# Patient Record
Sex: FEMALE | Race: WHITE | NOT HISPANIC OR LATINO | URBAN - METROPOLITAN AREA
[De-identification: names, ages, dates, MRNs, and addresses within clinical notes are randomized per-mention and may not be internally consistent; named-entity substitution may affect disease eponyms.]

---

## 2020-09-11 ENCOUNTER — OFFICE (OUTPATIENT)
Dept: URBAN - METROPOLITAN AREA CLINIC 64 | Facility: CLINIC | Age: 60
End: 2020-09-11

## 2020-09-11 VITALS
SYSTOLIC BLOOD PRESSURE: 146 MMHG | WEIGHT: 206 LBS | HEART RATE: 75 BPM | HEIGHT: 68 IN | DIASTOLIC BLOOD PRESSURE: 91 MMHG

## 2020-09-11 DIAGNOSIS — R93.3 ABNORMAL FINDINGS ON DIAGNOSTIC IMAGING OF OTHER PARTS OF DI: ICD-10-CM

## 2020-09-11 PROCEDURE — 99204 OFFICE O/P NEW MOD 45 MIN: CPT | Performed by: INTERNAL MEDICINE

## 2024-10-31 ENCOUNTER — APPOINTMENT (OUTPATIENT)
Dept: WOMENS IMAGING | Facility: HOSPITAL | Age: 64
End: 2024-10-31
Payer: COMMERCIAL

## 2024-10-31 PROCEDURE — 77061 BREAST TOMOSYNTHESIS UNI: CPT | Performed by: RADIOLOGY

## 2024-10-31 PROCEDURE — G0279 TOMOSYNTHESIS, MAMMO: HCPCS | Performed by: RADIOLOGY

## 2024-10-31 PROCEDURE — 77065 DX MAMMO INCL CAD UNI: CPT | Performed by: RADIOLOGY

## 2024-11-20 ENCOUNTER — APPOINTMENT (OUTPATIENT)
Dept: OTHER | Facility: HOSPITAL | Age: 64
End: 2024-11-20
Payer: COMMERCIAL

## 2024-11-20 ENCOUNTER — LAB REQUISITION (OUTPATIENT)
Dept: LAB | Facility: HOSPITAL | Age: 64
End: 2024-11-20
Payer: COMMERCIAL

## 2024-11-20 ENCOUNTER — APPOINTMENT (OUTPATIENT)
Dept: WOMENS IMAGING | Facility: HOSPITAL | Age: 64
End: 2024-11-20
Payer: COMMERCIAL

## 2024-11-20 DIAGNOSIS — R92.1 MAMMOGRAPHIC CALCIFICATION FOUND ON DIAGNOSTIC IMAGING OF BREAST: ICD-10-CM

## 2024-11-20 PROCEDURE — 19081 BX BREAST 1ST LESION STRTCTC: CPT | Performed by: RADIOLOGY

## 2024-11-20 PROCEDURE — 88360 TUMOR IMMUNOHISTOCHEM/MANUAL: CPT | Performed by: STUDENT IN AN ORGANIZED HEALTH CARE EDUCATION/TRAINING PROGRAM

## 2024-11-20 PROCEDURE — 88305 TISSUE EXAM BY PATHOLOGIST: CPT | Performed by: STUDENT IN AN ORGANIZED HEALTH CARE EDUCATION/TRAINING PROGRAM

## 2024-11-20 PROCEDURE — A4648 IMPLANTABLE TISSUE MARKER: HCPCS | Performed by: RADIOLOGY

## 2024-11-20 PROCEDURE — C1819 TISSUE LOCALIZATION-EXCISION: HCPCS | Performed by: RADIOLOGY

## 2024-11-20 PROCEDURE — 88341 IMHCHEM/IMCYTCHM EA ADD ANTB: CPT | Performed by: STUDENT IN AN ORGANIZED HEALTH CARE EDUCATION/TRAINING PROGRAM

## 2024-11-20 PROCEDURE — 88342 IMHCHEM/IMCYTCHM 1ST ANTB: CPT | Performed by: STUDENT IN AN ORGANIZED HEALTH CARE EDUCATION/TRAINING PROGRAM

## 2024-11-21 ENCOUNTER — DOCUMENTATION (OUTPATIENT)
Dept: SURGERY | Facility: CLINIC | Age: 64
End: 2024-11-21
Payer: COMMERCIAL

## 2024-11-21 NOTE — PROGRESS NOTES
Patient scheduled with Dr. Eugene, worksheet given to [place] to send new patient paperwork. Annie at [facility] calling patient to inform her of appointment

## 2024-11-22 LAB
CYTO UR: NORMAL
DX PRELIMINARY: NORMAL
LAB AP CASE REPORT: NORMAL
LAB AP INTRADEPARTMENTAL CONSULT: NORMAL
LAB AP SPECIAL STAINS: NORMAL
PATH REPORT.FINAL DX SPEC: NORMAL
PATH REPORT.GROSS SPEC: NORMAL

## 2024-11-25 PROBLEM — N60.91 ATYPICAL DUCTAL HYPERPLASIA OF RIGHT BREAST: Status: ACTIVE | Noted: 2024-11-25

## 2024-12-02 NOTE — PROGRESS NOTES
[facility]     Referring Provider: Lisa Serrano MD     Chief complaint: newly diagnosed atypical hyperplasia     Subjective   HPI: Ms. Liliam Collins is a 64 y.o. yo woman, seen at the request of Lisa Serrano MD, for a new diagnosis of right breast ADH.      This was initially detected as an imaging abnormality on routine screening. She denies any changes to her breast, no masses, skin changes or nipple discharge. She has previoulsy undergone 1 previous breast biopsy on the left side.     She reports a pertinent PMH significant for HTN, Restless Leg Syndrome.     She denies any family history of breast or ovarian cancer.     She was joined today in clinic by her wife. She gave consent for her wife to be present during her examination and participate in the discussion.         Review of Systems   Constitutional: Negative.  Negative for appetite change, fatigue and fever.   HENT:   Positive for hearing loss and tinnitus.    Eyes: Negative.    Respiratory: Negative.  Negative for cough and shortness of breath.    Cardiovascular: Negative.    Gastrointestinal: Negative.  Negative for abdominal distention, diarrhea and nausea.   Endocrine: Negative.    Genitourinary: Negative.     Musculoskeletal: Negative.  Negative for arthralgias and back pain.   Skin: Negative.    Neurological: Negative.  Negative for dizziness, headaches and speech difficulty.   Hematological: Negative.    Psychiatric/Behavioral: Negative.  Negative for decreased concentration and sleep disturbance.        Medications:    Current Outpatient Medications:     amLODIPine (NORVASC) 10 MG tablet, Take 1 tablet by mouth Daily., Disp: , Rfl:     losartan (COZAAR) 100 MG tablet, Take 1 tablet by mouth Daily., Disp: , Rfl:     rOPINIRole (REQUIP) 1 MG tablet, Take 1 tablet by mouth Daily., Disp: , Rfl:     Allergies:  Allergies   Allergen Reactions    Lisinopril Unknown - Low Severity       Medical history:  History reviewed. No  "pertinent past medical history.    Surgical History:  History reviewed. No pertinent surgical history.    Family History:  Family History   Problem Relation Age of Onset    Melanoma Mother        Cancer Family History: Age of diagnosis/Age at death OR Age as of today  Breast Cancer: Denies  Ovarian Cancer: Denies  Pancreatic Cancer: Denies  Prostate Cancer: Denies  Colon Cancer: Denies  Lung Cancer: Denies     Social History:   Social History     Socioeconomic History    Marital status:    Tobacco Use    Smoking status: Never    Smokeless tobacco: Never   Vaping Use    Vaping status: Never Used   Substance and Sexual Activity    Alcohol use: Never    Drug use: Never       She lives with her wife.  She is employed as a .       GYNECOLOGIC HISTORY:   . P: 0. AB: 0.  Last menstrual period:   Age at menarche: 15  Age at first childbirth: N/A  Lactation: N/A  Age at menopause: 52  Total years of oral contraceptive use: 0  Total years of hormone replacement therapy: 0      Objective   PHYSICAL EXAMINATION  This exam was chaperoned by: [place]  /82   Pulse 79   Resp 18   Ht 173 cm (68.11\")   Wt 98.4 kg (217 lb)   SpO2 96%   BMI 32.89 kg/m²   ECOG 0 - Asymptomatic  General: NAD, well appearing  Psych: a&o x 3, normal mood and affect  Eyes: EOMI, no scleral icterus  ENMT: neck supple without masses or thyromegaly, mucus membranes moist  Resp: normal effort  CV: RRR, no edema   GI: soft, NT, ND  MSK: normal gait, normal ROM in bilateral shoulders  Lymph nodes:  no cervical, supraclavicular or axillary lymphadenopathy  Breast: symmetric, Bra 44DD, grade 3 ptosis  Right:  No visible abnormalities on inspection while seated, with arms raised or hands on hips. No masses, skin changes, or nipple abnormalities.  Left:  No visible abnormalities on inspection while seated, with arms raised or hands on hips. No masses, skin changes, or nipple abnormalities.      Imaging - documented images below have been " personally reviewed:  10/24/24 Bilateral Screening Mammogram  Scattered areas of fibroglandular density.  Calcifications measuring 4 mm seen in the posterior one third upper outer region of the right breast.  Left breast, no suspicious masses, significant calcifications or other abnormalities are seen.  Impression: Calcifications in the right breast require additional evaluation  BI-RADS 0    10/31/2024 right diagnostic mammogram  Additional evaluation for the calcifications in the right breast seen 10/24/2024.  On present there are amorphous calcifications with grouped distribution in the posterior one third upper outer region of the right breast.  Impression: Calcifications in the right breast are suspicious stereotactic biopsy is recommended  BI-RADS 4A    11/20/2024 stereotactic biopsy  Patient's right breast upper outer quadrant position was isolated and the calcifications were localized.  Using a superior approach and a 9 gauge vacuum-assisted device 12 core needle biopsy specimens were obtained.  A Top-Hat shaped biopsy clip was placed within the biopsy bed.  2 view postprocedure mammogram shows clip within the biopsy bed.  Pathology shows atypical ductal hyperplasia. Pathology is concordant.     Pathology:  11/20/24  1.  Breast, right upper outer quadrant, stereotactic core biopsy for calcifications: (Top-Hat clip)               A.  Atypical ductal hyperplasia with associated microcalcifications.               B.  Sclerosing adenosis.    Assessment & Plan   Assessment:  64 y.o. F with a new diagnosis of right breast Atypical Ductal Hyperplasia.     Discussion:  We discussed the clinical significance of ADH in terms of the potential for identifying associated malignancy at time of excision as well as its being a risk marker for future breast cancer in either breast.     We discussed management options in terms of ADH being a potential precursor, high risk benign lesion including complete surgical removal to  evaluate for associated malignancy as well as surveillance. The partial mastectomy procedure was explained. Potential risks of surgery including bleeding, infection, hematoma and scar were reviewed and we also discussed the potential need to return to the operating room in the event of close margins or malignancy were identified in the surgical specimen in which case a definitive cancer operation would be recommended.     We further reviewed that atypical hyperplasia is considered a risk factor for future development of a breast cancer in either breast, with the associated risk being approximately 2-4-fold general population risk (10-year estimated risk of ~17-26%; Breast Cancer Res Treat (2012) 136:627-633). We discussed recommendations for high risk breast surveillance going forward, with the goal of early detection should she develop a breast cancer. This would include annual mammography as well as annual breast mri, alternating every 6 months and combined with breast exams. In terms of chemoprevention, briefly discussed one of the original prevention trials that noted a 44-55% (overall, 3.7% versus 1.8%) reduction in breast cancer development at 5-years of tamoxifen use (J Natl Cancer Inst (1998) 90:1371-88). Noted that the benefit for individuals with atypical ductal hyperplasia (ADH) or atypical lobular breast hyperplasia (ALH) is even higher, estimated to reduce the risk of breast cancer development over 10 years from 21.3% down to 7.5% (Breast Cancer Res Treat (2012) 136:627-633). This benefit is cancer development but does not affect overall survival. We also discussed that the actual number of individuals receiving benefit must be weighed against the risks of tamoxifen which include vascular related events (stroke 1.59 times risk, pulmonary embolism 3 times risk and deep vein thrombosis 1.6 times risk versus placebo) and endometrial cancer (2.53 times increased risk versus placebo)(J Natl Cancer Inst  (1998) 90:1371-88). The risk of PE in more modern series can range from 1-2% per year, but usually drops after the first 24-36 months of therapy. We also discussed that alternate approaches can include aromatase inhibitors (when postmenopausal) and possible low tamoxifen therapy (5 mg daily or 10 mg every other day) as less common approaches that are being studied.             Plan:    - Surgical plans right breast partial mastectomy, kayley guided - remove remaining calcifications and ensure no adjacent cancer.  - medical oncology for discussion of chemoprevention - referral will be discussed post op pending final surgical pathology  - annual mammography alternating with annual MRI, will obtain one preop  - valium for cluastrophobia  - follow-up breast surgical oncology for clinical exam 1-2 times per year      Namrata Eugene MD  Breast Surgical Oncology    I spent 60 minutes caring for Liliam on this date of service. This time includes time spent by me in the following activities: preparing for the visit, reviewing tests, obtaining and/or reviewing a separately obtained history, performing a medically appropriate examination and/or evaluation , counseling and educating the patient/family/caregiver, ordering medications, tests, or procedures, referring and communicating with other health care professionals , documenting information in the medical record, independently interpreting results and communicating that information with the patient/family/caregiver, and care coordination.      CC:  MD Liz Sherman, MD Nilsa Curtis Katherine MD

## 2024-12-04 ENCOUNTER — OFFICE VISIT (OUTPATIENT)
Dept: SURGERY | Facility: CLINIC | Age: 64
End: 2024-12-04
Payer: COMMERCIAL

## 2024-12-04 ENCOUNTER — PREP FOR SURGERY (OUTPATIENT)
Dept: OTHER | Facility: HOSPITAL | Age: 64
End: 2024-12-04

## 2024-12-04 VITALS
HEART RATE: 79 BPM | DIASTOLIC BLOOD PRESSURE: 82 MMHG | OXYGEN SATURATION: 96 % | RESPIRATION RATE: 18 BRPM | SYSTOLIC BLOOD PRESSURE: 152 MMHG | HEIGHT: 68 IN | BODY MASS INDEX: 32.89 KG/M2 | WEIGHT: 217 LBS

## 2024-12-04 DIAGNOSIS — N60.91 ATYPICAL DUCTAL HYPERPLASIA OF RIGHT BREAST: Primary | ICD-10-CM

## 2024-12-04 DIAGNOSIS — F40.240 CLAUSTROPHOBIA: ICD-10-CM

## 2024-12-04 PROBLEM — G25.81 RESTLESS LEG SYNDROME: Status: ACTIVE | Noted: 2020-07-15

## 2024-12-04 PROBLEM — Z80.8 FAMILY HISTORY OF MELANOMA: Status: ACTIVE | Noted: 2022-12-28

## 2024-12-04 RX ORDER — AMLODIPINE BESYLATE 10 MG/1
10 TABLET ORAL DAILY
COMMUNITY
Start: 2024-11-05

## 2024-12-04 RX ORDER — ROPINIROLE 1 MG/1
1 TABLET, FILM COATED ORAL DAILY
COMMUNITY
Start: 2024-07-05

## 2024-12-04 RX ORDER — DIAZEPAM 10 MG/1
10 TABLET ORAL ONCE
Qty: 1 TABLET | Refills: 0 | Status: SHIPPED | OUTPATIENT
Start: 2024-12-04 | End: 2024-12-04

## 2024-12-04 RX ORDER — LOSARTAN POTASSIUM 100 MG/1
100 TABLET ORAL DAILY
COMMUNITY
Start: 2024-06-25

## 2024-12-04 NOTE — LETTER
December 4, 2024     Pina Hill MD  [address]  [address]  [place] [address]    Patient: Liliam Collins   YOB: 1960   Date of Visit: 12/4/2024     Dear Pina Hill MD:       Thank you for referring Liliam Collins to me for evaluation. Below are the relevant portions of my assessment and plan of care.    If you have questions, please do not hesitate to call me. I look forward to following Liliam along with you.         Sincerely,        Namrata Eugene MD        CC: MD Yoko Sherman MD Couch, MD Namrata  12/04/24 1405  Sign when Signing Visit  [facility]     Referring Provider: Lisa Serrano MD     Chief complaint: newly diagnosed atypical hyperplasia     Subjective  HPI: Ms. Liliam Collins is a 64 y.o. yo woman, seen at the request of Lisa Serrano MD, for a new diagnosis of right breast ADH.      This was initially detected as an imaging abnormality on routine screening. She denies any changes to her breast, no masses, skin changes or nipple discharge. She has previoulsy undergone 1 previous breast biopsy on the left side.     She reports a pertinent PMH significant for HTN, Restless Leg Syndrome.     She denies any family history of breast or ovarian cancer.     She was joined today in clinic by her wife. She gave consent for her wife to be present during her examination and participate in the discussion.         Review of Systems   Constitutional: Negative.  Negative for appetite change, fatigue and fever.   HENT:   Positive for hearing loss and tinnitus.    Eyes: Negative.    Respiratory: Negative.  Negative for cough and shortness of breath.    Cardiovascular: Negative.    Gastrointestinal: Negative.  Negative for abdominal distention, diarrhea and nausea.   Endocrine: Negative.    Genitourinary: Negative.     Musculoskeletal: Negative.  Negative for arthralgias and back pain.   Skin: Negative.    Neurological: Negative.   "Negative for dizziness, headaches and speech difficulty.   Hematological: Negative.    Psychiatric/Behavioral: Negative.  Negative for decreased concentration and sleep disturbance.        Medications:    Current Outpatient Medications:   •  amLODIPine (NORVASC) 10 MG tablet, Take 1 tablet by mouth Daily., Disp: , Rfl:   •  losartan (COZAAR) 100 MG tablet, Take 1 tablet by mouth Daily., Disp: , Rfl:   •  rOPINIRole (REQUIP) 1 MG tablet, Take 1 tablet by mouth Daily., Disp: , Rfl:     Allergies:  Allergies   Allergen Reactions   • Lisinopril Unknown - Low Severity       Medical history:  History reviewed. No pertinent past medical history.    Surgical History:  History reviewed. No pertinent surgical history.    Family History:  Family History   Problem Relation Age of Onset   • Melanoma Mother        Cancer Family History: Age of diagnosis/Age at death OR Age as of today  Breast Cancer: Denies  Ovarian Cancer: Denies  Pancreatic Cancer: Denies  Prostate Cancer: Denies  Colon Cancer: Denies  Lung Cancer: Denies     Social History:   Social History     Socioeconomic History   • Marital status:    Tobacco Use   • Smoking status: Never   • Smokeless tobacco: Never   Vaping Use   • Vaping status: Never Used   Substance and Sexual Activity   • Alcohol use: Never   • Drug use: Never       She lives with her wife.  She is employed as a .       GYNECOLOGIC HISTORY:   . P: 0. AB: 0.  Last menstrual period:   Age at menarche: 15  Age at first childbirth: N/A  Lactation: N/A  Age at menopause: 52  Total years of oral contraceptive use: 0  Total years of hormone replacement therapy: 0      Objective  PHYSICAL EXAMINATION  This exam was chaperoned by: [place]  /82   Pulse 79   Resp 18   Ht 173 cm (68.11\")   Wt 98.4 kg (217 lb)   SpO2 96%   BMI 32.89 kg/m²   ECOG 0 - Asymptomatic  General: NAD, well appearing  Psych: a&o x 3, normal mood and affect  Eyes: EOMI, no scleral icterus  ENMT: neck supple " without masses or thyromegaly, mucus membranes moist  Resp: normal effort  CV: RRR, no edema   GI: soft, NT, ND  MSK: normal gait, normal ROM in bilateral shoulders  Lymph nodes:  no cervical, supraclavicular or axillary lymphadenopathy  Breast: symmetric, Bra 44DD, grade 3 ptosis  Right:  No visible abnormalities on inspection while seated, with arms raised or hands on hips. No masses, skin changes, or nipple abnormalities.  Left:  No visible abnormalities on inspection while seated, with arms raised or hands on hips. No masses, skin changes, or nipple abnormalities.      Imaging - documented images below have been personally reviewed:  10/24/24 Bilateral Screening Mammogram  Scattered areas of fibroglandular density.  Calcifications measuring 4 mm seen in the posterior one third upper outer region of the right breast.  Left breast, no suspicious masses, significant calcifications or other abnormalities are seen.  Impression: Calcifications in the right breast require additional evaluation  BI-RADS 0    10/31/2024 right diagnostic mammogram  Additional evaluation for the calcifications in the right breast seen 10/24/2024.  On present there are amorphous calcifications with grouped distribution in the posterior one third upper outer region of the right breast.  Impression: Calcifications in the right breast are suspicious stereotactic biopsy is recommended  BI-RADS 4A    11/20/2024 stereotactic biopsy  Patient's right breast upper outer quadrant position was isolated and the calcifications were localized.  Using a superior approach and a 9 gauge vacuum-assisted device 12 core needle biopsy specimens were obtained.  A Top-Hat shaped biopsy clip was placed within the biopsy bed.  2 view postprocedure mammogram shows clip within the biopsy bed.  Pathology shows atypical ductal hyperplasia. Pathology is concordant.     Pathology:  11/20/24  1.  Breast, right upper outer quadrant, stereotactic core biopsy for  calcifications: (Top-Hat clip)               A.  Atypical ductal hyperplasia with associated microcalcifications.               B.  Sclerosing adenosis.    Assessment & Plan  Assessment:  64 y.o. F with a new diagnosis of right breast Atypical Ductal Hyperplasia.     Discussion:  We discussed the clinical significance of ADH in terms of the potential for identifying associated malignancy at time of excision as well as its being a risk marker for future breast cancer in either breast.     We discussed management options in terms of ADH being a potential precursor, high risk benign lesion including complete surgical removal to evaluate for associated malignancy as well as surveillance. The partial mastectomy procedure was explained. Potential risks of surgery including bleeding, infection, hematoma and scar were reviewed and we also discussed the potential need to return to the operating room in the event of close margins or malignancy were identified in the surgical specimen in which case a definitive cancer operation would be recommended.     We further reviewed that atypical hyperplasia is considered a risk factor for future development of a breast cancer in either breast, with the associated risk being approximately 2-4-fold general population risk (10-year estimated risk of ~17-26%; Breast Cancer Res Treat (2012) 136:627-633). We discussed recommendations for high risk breast surveillance going forward, with the goal of early detection should she develop a breast cancer. This would include annual mammography as well as annual breast mri, alternating every 6 months and combined with breast exams. In terms of chemoprevention, briefly discussed one of the original prevention trials that noted a 44-55% (overall, 3.7% versus 1.8%) reduction in breast cancer development at 5-years of tamoxifen use (J Natl Cancer Inst (1998) 90:1371-88). Noted that the benefit for individuals with atypical ductal hyperplasia (ADH) or  atypical lobular breast hyperplasia (ALH) is even higher, estimated to reduce the risk of breast cancer development over 10 years from 21.3% down to 7.5% (Breast Cancer Res Treat (2012) 136:627-633). This benefit is cancer development but does not affect overall survival. We also discussed that the actual number of individuals receiving benefit must be weighed against the risks of tamoxifen which include vascular related events (stroke 1.59 times risk, pulmonary embolism 3 times risk and deep vein thrombosis 1.6 times risk versus placebo) and endometrial cancer (2.53 times increased risk versus placebo)(J Natl Cancer Inst (1998) 90:1371-88). The risk of PE in more modern series can range from 1-2% per year, but usually drops after the first 24-36 months of therapy. We also discussed that alternate approaches can include aromatase inhibitors (when postmenopausal) and possible low tamoxifen therapy (5 mg daily or 10 mg every other day) as less common approaches that are being studied.             Plan:    - Surgical plans right breast partial mastectomy, kayley guided - remove remaining calcifications and ensure no adjacent cancer.  - medical oncology for discussion of chemoprevention - referral will be discussed post op pending final surgical pathology  - annual mammography alternating with annual MRI, will obtain one preop  - follow-up breast surgical oncology for clinical exam 1-2 times per year      Namrata Eugene MD  Breast Surgical Oncology    I spent 60 minutes caring for Liliam on this date of service. This time includes time spent by me in the following activities: preparing for the visit, reviewing tests, obtaining and/or reviewing a separately obtained history, performing a medically appropriate examination and/or evaluation , counseling and educating the patient/family/caregiver, ordering medications, tests, or procedures, referring and communicating with other health care professionals , documenting information  in the medical record, independently interpreting results and communicating that information with the patient/family/caregiver, and care coordination.      CC:  MD Liz Sherman, MD Nilsa Curtis, Yoko PEDROZA

## 2024-12-05 ENCOUNTER — TELEPHONE (OUTPATIENT)
Dept: SURGERY | Facility: CLINIC | Age: 64
End: 2024-12-05
Payer: COMMERCIAL

## 2024-12-05 NOTE — TELEPHONE ENCOUNTER
Pt notified her bilateral breast MRI has been moved up to 12/11/2024 at [facility] arrival time of 5:00 p.m.    CMA

## 2024-12-06 ENCOUNTER — PATIENT ROUNDING (BHMG ONLY) (OUTPATIENT)
Dept: SURGERY | Facility: CLINIC | Age: 64
End: 2024-12-06
Payer: COMMERCIAL

## 2024-12-06 NOTE — PROGRESS NOTES
December 6, 2024    Hello, may I speak with Liliam Collins?    My name is Sofi      I am  with [facility]  [address] [address] [phone].    Before we get started may I verify your date of birth? 1960    I am calling to officially welcome you to our practice and ask about your recent visit. Is this a good time to talk? yes  Done in clinic at time of visit    Tell me about your visit with us. What things went well?  Everything- pt is very happy with [facility] as a whole       We're always looking for ways to make our patients' experiences even better. Do you have recommendations on ways we may improve?  no    Overall were you satisfied with your first visit to our practice? yes       I appreciate you taking the time to speak with me today. Is there anything else I can do for you? no      Thank you, and have a great day.

## 2024-12-09 ENCOUNTER — TELEPHONE (OUTPATIENT)
Dept: SURGERY | Facility: CLINIC | Age: 64
End: 2024-12-09
Payer: COMMERCIAL

## 2024-12-09 NOTE — TELEPHONE ENCOUNTER
Pt notified of the following appts:    Sharee placement is scheduled on 1/3/2025 at [facility] 2:00  PAT is scheduled on 1/3/2025 at 4:00  Surgery is scheduled on 1/10/2025 arrive at 11:30 for a 1:30 start time.   Post op appt is scheduled on 1/22/2025 at 1:30    Pt verbalized understanding of appt times, dates and locations. Surgery packet mailed.     CMA

## 2024-12-09 NOTE — TELEPHONE ENCOUNTER
Left message for pt to call back to confirm the following appts:    Sharee placement is scheduled at [facility] on 1/3/2025 at 2:00  PAT is scheduled same day 1/3/2025 at 4:00  Surgery is scheduled on 1/10/2025 arrive at 11:30 for a surgery start time of 1:30     Surgery packet mailed.     CMA

## 2024-12-11 ENCOUNTER — HOSPITAL ENCOUNTER (OUTPATIENT)
Facility: HOSPITAL | Age: 64
Discharge: HOME | End: 2024-12-11
Admitting: STUDENT IN AN ORGANIZED HEALTH CARE EDUCATION/TRAINING PROGRAM
Payer: COMMERCIAL

## 2024-12-11 DIAGNOSIS — N60.91 ATYPICAL DUCTAL HYPERPLASIA OF RIGHT BREAST: ICD-10-CM

## 2024-12-11 PROCEDURE — 25510000002 GADOBENATE DIMEGLUMINE 529 MG/ML SOLUTION: Performed by: STUDENT IN AN ORGANIZED HEALTH CARE EDUCATION/TRAINING PROGRAM

## 2024-12-11 PROCEDURE — A9577 INJ MULTIHANCE: HCPCS | Performed by: STUDENT IN AN ORGANIZED HEALTH CARE EDUCATION/TRAINING PROGRAM

## 2024-12-11 PROCEDURE — 77049 MRI BREAST C-+ W/CAD BI: CPT

## 2024-12-11 RX ADMIN — GADOBENATE DIMEGLUMINE 20 ML: 529 INJECTION, SOLUTION INTRAVENOUS at 18:20

## 2024-12-13 ENCOUNTER — TELEPHONE (OUTPATIENT)
Dept: SURGERY | Facility: CLINIC | Age: 64
End: 2024-12-13
Payer: COMMERCIAL

## 2024-12-13 NOTE — TELEPHONE ENCOUNTER
Called Liliam Collins to discuss imaging results.  All questions answered.  Needs breast US, my office will schedule. No longer out of town around the end of December - ok to schedule soon    Namrata Eugene MD

## 2024-12-16 ENCOUNTER — TELEPHONE (OUTPATIENT)
Dept: SURGERY | Facility: CLINIC | Age: 64
End: 2024-12-16
Payer: COMMERCIAL

## 2024-12-16 DIAGNOSIS — N60.91 ATYPICAL DUCTAL HYPERPLASIA OF RIGHT BREAST: Primary | ICD-10-CM

## 2024-12-16 DIAGNOSIS — R92.8 ABNORMAL MAMMOGRAM: ICD-10-CM

## 2024-12-16 NOTE — TELEPHONE ENCOUNTER
Lmtc to confirm the following appt:  Left breast U/S is scheduled at [facility] on 12/26/2024 at 12:30    CMA

## 2024-12-26 ENCOUNTER — HOSPITAL ENCOUNTER (OUTPATIENT)
Dept: ULTRASOUND IMAGING | Facility: HOSPITAL | Age: 64
Discharge: HOME | End: 2024-12-26
Admitting: STUDENT IN AN ORGANIZED HEALTH CARE EDUCATION/TRAINING PROGRAM
Payer: COMMERCIAL

## 2024-12-26 ENCOUNTER — TELEPHONE (OUTPATIENT)
Dept: SURGERY | Facility: CLINIC | Age: 64
End: 2024-12-26
Payer: COMMERCIAL

## 2024-12-26 DIAGNOSIS — R92.8 ABNORMAL MAMMOGRAM: ICD-10-CM

## 2024-12-26 PROCEDURE — 76642 ULTRASOUND BREAST LIMITED: CPT

## 2024-12-26 NOTE — TELEPHONE ENCOUNTER
Left breast u/s performed today.  Dr. Marin recommends a left breast u/s guided biopsy.  Pt is scheduled at [facility] on 1/3/2025 for a kayley placement.  Dr. Marin requests that this biopsy be performed at [facility] due to this pts upcoming surgery.     Pt has been notified her arrival time at [facility] has changed to 12:45 so her biopsy can be performed prior to her kayley placement.     Pt verbalized understanding.      CMA

## 2025-01-03 ENCOUNTER — APPOINTMENT (OUTPATIENT)
Dept: WOMENS IMAGING | Facility: HOSPITAL | Age: 65
End: 2025-01-03
Payer: COMMERCIAL

## 2025-01-03 ENCOUNTER — LAB REQUISITION (OUTPATIENT)
Dept: LAB | Facility: HOSPITAL | Age: 65
End: 2025-01-03
Payer: COMMERCIAL

## 2025-01-03 ENCOUNTER — PRE-ADMISSION TESTING (OUTPATIENT)
Dept: PREADMISSION TESTING | Facility: HOSPITAL | Age: 65
End: 2025-01-03
Payer: COMMERCIAL

## 2025-01-03 VITALS
TEMPERATURE: 96.2 F | HEART RATE: 81 BPM | DIASTOLIC BLOOD PRESSURE: 81 MMHG | OXYGEN SATURATION: 98 % | RESPIRATION RATE: 16 BRPM | SYSTOLIC BLOOD PRESSURE: 135 MMHG | BODY MASS INDEX: 32.02 KG/M2 | HEIGHT: 69 IN | WEIGHT: 216.2 LBS

## 2025-01-03 DIAGNOSIS — N60.91 ATYPICAL DUCTAL HYPERPLASIA OF RIGHT BREAST: ICD-10-CM

## 2025-01-03 DIAGNOSIS — N63.0 UNSPECIFIED LUMP IN UNSPECIFIED BREAST: ICD-10-CM

## 2025-01-03 LAB
ANION GAP SERPL CALCULATED.3IONS-SCNC: 14.3 MMOL/L (ref 5–15)
BASOPHILS # BLD AUTO: 0.05 10*3/MM3 (ref 0–0.2)
BASOPHILS NFR BLD AUTO: 0.6 % (ref 0–1.5)
BUN SERPL-MCNC: 11 MG/DL (ref 8–23)
BUN/CREAT SERPL: 12.6 (ref 7–25)
CALCIUM SPEC-SCNC: 9.3 MG/DL (ref 8.6–10.5)
CHLORIDE SERPL-SCNC: 102 MMOL/L (ref 98–107)
CO2 SERPL-SCNC: 23.7 MMOL/L (ref 22–29)
CREAT SERPL-MCNC: 0.87 MG/DL (ref 0.57–1)
DEPRECATED RDW RBC AUTO: 39.9 FL (ref 37–54)
EGFRCR SERPLBLD CKD-EPI 2021: 75.4 ML/MIN/1.73
EOSINOPHIL # BLD AUTO: 0.13 10*3/MM3 (ref 0–0.4)
EOSINOPHIL NFR BLD AUTO: 1.6 % (ref 0.3–6.2)
ERYTHROCYTE [DISTWIDTH] IN BLOOD BY AUTOMATED COUNT: 12.3 % (ref 12.3–15.4)
GLUCOSE SERPL-MCNC: 95 MG/DL (ref 65–99)
HCT VFR BLD AUTO: 42.7 % (ref 34–46.6)
HGB BLD-MCNC: 13.8 G/DL (ref 12–15.9)
IMM GRANULOCYTES # BLD AUTO: 0.03 10*3/MM3 (ref 0–0.05)
IMM GRANULOCYTES NFR BLD AUTO: 0.4 % (ref 0–0.5)
LYMPHOCYTES # BLD AUTO: 1.94 10*3/MM3 (ref 0.7–3.1)
LYMPHOCYTES NFR BLD AUTO: 23.3 % (ref 19.6–45.3)
MCH RBC QN AUTO: 29.1 PG (ref 26.6–33)
MCHC RBC AUTO-ENTMCNC: 32.3 G/DL (ref 31.5–35.7)
MCV RBC AUTO: 89.9 FL (ref 79–97)
MONOCYTES # BLD AUTO: 0.68 10*3/MM3 (ref 0.1–0.9)
MONOCYTES NFR BLD AUTO: 8.2 % (ref 5–12)
NEUTROPHILS NFR BLD AUTO: 5.49 10*3/MM3 (ref 1.7–7)
NEUTROPHILS NFR BLD AUTO: 65.9 % (ref 42.7–76)
NRBC BLD AUTO-RTO: 0 /100 WBC (ref 0–0.2)
PLATELET # BLD AUTO: 301 10*3/MM3 (ref 140–450)
PMV BLD AUTO: 10.6 FL (ref 6–12)
POTASSIUM SERPL-SCNC: 4.1 MMOL/L (ref 3.5–5.2)
QT INTERVAL: 396 MS
QTC INTERVAL: 437 MS
RBC # BLD AUTO: 4.75 10*6/MM3 (ref 3.77–5.28)
SODIUM SERPL-SCNC: 140 MMOL/L (ref 136–145)
WBC NRBC COR # BLD AUTO: 8.32 10*3/MM3 (ref 3.4–10.8)

## 2025-01-03 PROCEDURE — 19083 BX BREAST 1ST LESION US IMAG: CPT | Performed by: RADIOLOGY

## 2025-01-03 PROCEDURE — C1728 CATH, BRACHYTX SEED ADM: HCPCS | Performed by: RADIOLOGY

## 2025-01-03 PROCEDURE — 93005 ELECTROCARDIOGRAM TRACING: CPT

## 2025-01-03 PROCEDURE — 36415 COLL VENOUS BLD VENIPUNCTURE: CPT

## 2025-01-03 PROCEDURE — A4648 IMPLANTABLE TISSUE MARKER: HCPCS | Performed by: RADIOLOGY

## 2025-01-03 PROCEDURE — 85025 COMPLETE CBC W/AUTO DIFF WBC: CPT

## 2025-01-03 PROCEDURE — 88305 TISSUE EXAM BY PATHOLOGIST: CPT | Performed by: STUDENT IN AN ORGANIZED HEALTH CARE EDUCATION/TRAINING PROGRAM

## 2025-01-03 PROCEDURE — 19285 PERQ DEV BREAST 1ST US IMAG: CPT | Performed by: RADIOLOGY

## 2025-01-03 PROCEDURE — 80048 BASIC METABOLIC PNL TOTAL CA: CPT

## 2025-01-03 NOTE — DISCHARGE INSTRUCTIONS
Take the following medications the morning of surgery:    AMLODIPINE    If you are on prescription narcotic pain medication to control your pain you may also take that medication the morning of surgery.      General Instructions:     Do not eat solid food after midnight the night before surgery.  Clear liquids day of surgery are allowed but must be stopped at least two hours before your hospital arrival time.       Allowed clear liquids      Water, sodas, and tea or coffee with no cream or milk added.       12 to 20 ounces of a clear liquid that contains carbohydrates is recommended.  If non-diabetic, have Gatorade or Powerade.  If diabetic, have G2 or Powerade Zero.     Do not have liquids red in color.  Do not consume chicken, beef, pork or vegetable broth or bouillon cubes of any variety as they are not considered clear liquids and are not allowed.      Infants may have breast milk up to four hours before surgery.  Infants drinking formula may drink formula up to six hours before surgery.   Patients who avoid smoking, chewing tobacco and alcohol for 4 weeks prior to surgery have a reduced risk of post-operative complications.  Quit smoking as many days before surgery as you can.  Do not smoke, use chewing tobacco or drink alcohol the day of surgery.   If applicable bring your C-PAP/ BI-PAP machine in with you to preop day of surgery.  Bring any papers given to you in the doctor’s office.  Wear clean comfortable clothes.  Do not wear contact lenses, false eyelashes or make-up.  Bring a case for your glasses.   Bring crutches or walker if applicable.  Remove all piercings.  Leave jewelry and any other valuables at home.  Hair extensions with metal clips must be removed prior to surgery.  The Pre-Admission Testing nurse will instruct you to bring medications if unable to obtain an accurate list in Pre-Admission Testing.        If you were given a blood bank ID arm band remember to bring it with you the day of  surgery.    Preventing a Surgical Site Infection:  For 2 to 3 days before surgery, avoid shaving with a razor because the razor can irritate skin and make it easier to develop an infection.    Any areas of open skin can increase the risk of a post-operative wound infection by allowing bacteria to enter and travel throughout the body.  Notify your surgeon if you have any skin wounds / rashes even if it is not near the expected surgical site.  The area will need assessed to determine if surgery should be delayed until it is healed.  The night prior to surgery shower using a fresh bar of anti-bacterial soap (such as Dial) and clean washcloth.  Sleep in a clean bed with clean clothing.  Do not allow pets to sleep with you.  Shower on the morning of surgery using a fresh bar of anti-bacterial soap (such as Dial) and clean washcloth.  Dry with a clean towel and dress in clean clothing.  Ask your surgeon if you will be receiving antibiotics prior to surgery.  Make sure you, your family, and all healthcare providers clean their hands with soap and water or an alcohol based hand  before caring for you or your wound.    Day of surgery:  Your arrival time is approximately two hours before your scheduled surgery time.  Please note if you have an early arrival time the surgery doors do not open before 5:00 AM.  Upon arrival, a Pre-op nurse and Anesthesiologist will review your health history, obtain vital signs, and answer questions you may have.  The only belongings needed at this time will be a list of your home medications and if applicable your C-PAP/BI-PAP machine.  A Pre-op nurse will start an IV and you may receive medication in preparation for surgery, including something to help you relax.     Please be aware that surgery does come with discomfort.  We want to make every effort to control your discomfort so please discuss any uncontrolled symptoms with your nurse.   Your doctor will most likely have prescribed  pain medications.      If you are going home after surgery you will receive individualized written care instructions before being discharged.  A responsible adult must drive you to and from the hospital on the day of your surgery and ideally stay with you through the night.   .  Discharge prescriptions can be filled by the hospital pharmacy during regular pharmacy hours.  If you are having surgery late in the day/evening your prescription may be e-prescribed to your pharmacy.  Please verify your pharmacy hours or chose a 24 hour pharmacy to avoid not having access to your prescription because your pharmacy has closed for the day.    If you are staying overnight following surgery, you will be transported to your hospital room following the recovery period.  [facility] has all private rooms.    If you have any questions please call Pre-Admission Testing at [phone].  Deductibles and co-payments are collected on the day of service. Please be prepared to pay the required co-pay, deductible or deposit on the day of service as defined by your plan.    Call your surgeon immediately if you experience any of the following symptoms:  Sore Throat  Shortness of Breath or difficulty breathing  Cough  Chills  Body soreness or muscle pain  Headache  Fever  New loss of taste or smell  Do not arrive for your surgery ill.  Your procedure will need to be rescheduled to another time.  You will need to call your physician before the day of surgery to avoid any unnecessary exposure to hospital staff as well as other patients.

## 2025-01-08 LAB
CYTO UR: NORMAL
DX PRELIMINARY: NORMAL
LAB AP CASE REPORT: NORMAL
PATH REPORT.FINAL DX SPEC: NORMAL
PATH REPORT.GROSS SPEC: NORMAL

## 2025-01-09 ENCOUNTER — TELEPHONE (OUTPATIENT)
Dept: SURGERY | Facility: CLINIC | Age: 65
End: 2025-01-09
Payer: COMMERCIAL

## 2025-01-09 NOTE — TELEPHONE ENCOUNTER
Called Liliam Collins to discuss biopsy pathology results.  All questions answered.  I will follow up with her in clinic as scheduled.    Plan for surgery tomorrow to remove atypical ductal hyperplasia.  No intervention required for this biopsy result.

## 2025-01-10 ENCOUNTER — APPOINTMENT (OUTPATIENT)
Dept: GENERAL RADIOLOGY | Facility: HOSPITAL | Age: 65
End: 2025-01-10
Payer: COMMERCIAL

## 2025-01-10 ENCOUNTER — HOSPITAL ENCOUNTER (OUTPATIENT)
Facility: HOSPITAL | Age: 65
Setting detail: HOSPITAL OUTPATIENT SURGERY
Discharge: HOME | End: 2025-01-10
Attending: STUDENT IN AN ORGANIZED HEALTH CARE EDUCATION/TRAINING PROGRAM | Admitting: STUDENT IN AN ORGANIZED HEALTH CARE EDUCATION/TRAINING PROGRAM
Payer: COMMERCIAL

## 2025-01-10 ENCOUNTER — ANESTHESIA EVENT (OUTPATIENT)
Dept: PERIOP | Facility: HOSPITAL | Age: 65
End: 2025-01-10
Payer: COMMERCIAL

## 2025-01-10 ENCOUNTER — TRANSCRIBE ORDERS (OUTPATIENT)
Dept: LAB | Facility: HOSPITAL | Age: 65
End: 2025-01-10
Payer: COMMERCIAL

## 2025-01-10 ENCOUNTER — ANCILLARY PROCEDURE (OUTPATIENT)
Dept: LAB | Facility: HOSPITAL | Age: 65
End: 2025-01-10
Payer: COMMERCIAL

## 2025-01-10 ENCOUNTER — ANESTHESIA (OUTPATIENT)
Dept: PERIOP | Facility: HOSPITAL | Age: 65
End: 2025-01-10
Payer: COMMERCIAL

## 2025-01-10 VITALS
SYSTOLIC BLOOD PRESSURE: 129 MMHG | RESPIRATION RATE: 18 BRPM | HEART RATE: 77 BPM | TEMPERATURE: 98.1 F | OXYGEN SATURATION: 98 % | DIASTOLIC BLOOD PRESSURE: 77 MMHG

## 2025-01-10 DIAGNOSIS — N60.91 ATYPICAL DUCTAL HYPERPLASIA OF RIGHT BREAST: ICD-10-CM

## 2025-01-10 DIAGNOSIS — N60.91 ATYPICAL DUCTAL HYPERPLASIA OF RIGHT BREAST: Primary | ICD-10-CM

## 2025-01-10 PROCEDURE — S0260 H&P FOR SURGERY: HCPCS | Performed by: STUDENT IN AN ORGANIZED HEALTH CARE EDUCATION/TRAINING PROGRAM

## 2025-01-10 PROCEDURE — 25010000002 BUPIVACAINE (PF) 0.5 % SOLUTION 10 ML VIAL: Performed by: STUDENT IN AN ORGANIZED HEALTH CARE EDUCATION/TRAINING PROGRAM

## 2025-01-10 PROCEDURE — 88341 IMHCHEM/IMCYTCHM EA ADD ANTB: CPT | Performed by: STUDENT IN AN ORGANIZED HEALTH CARE EDUCATION/TRAINING PROGRAM

## 2025-01-10 PROCEDURE — 19125 EXCISION BREAST LESION: CPT | Performed by: STUDENT IN AN ORGANIZED HEALTH CARE EDUCATION/TRAINING PROGRAM

## 2025-01-10 PROCEDURE — 25010000002 ONDANSETRON PER 1 MG: Performed by: NURSE ANESTHETIST, CERTIFIED REGISTERED

## 2025-01-10 PROCEDURE — 25010000002 PROPOFOL 1000 MG/100ML EMULSION: Performed by: NURSE ANESTHETIST, CERTIFIED REGISTERED

## 2025-01-10 PROCEDURE — 88342 IMHCHEM/IMCYTCHM 1ST ANTB: CPT | Performed by: STUDENT IN AN ORGANIZED HEALTH CARE EDUCATION/TRAINING PROGRAM

## 2025-01-10 PROCEDURE — 25010000002 CEFAZOLIN PER 500 MG: Performed by: STUDENT IN AN ORGANIZED HEALTH CARE EDUCATION/TRAINING PROGRAM

## 2025-01-10 PROCEDURE — 88307 TISSUE EXAM BY PATHOLOGIST: CPT | Performed by: STUDENT IN AN ORGANIZED HEALTH CARE EDUCATION/TRAINING PROGRAM

## 2025-01-10 PROCEDURE — 76098 X-RAY EXAM SURGICAL SPECIMEN: CPT

## 2025-01-10 PROCEDURE — 25010000002 LIDOCAINE 1% - EPINEPHRINE 1:100000 1 %-1:100000 SOLUTION 30 ML VIAL: Performed by: STUDENT IN AN ORGANIZED HEALTH CARE EDUCATION/TRAINING PROGRAM

## 2025-01-10 PROCEDURE — 25010000002 DEXAMETHASONE SODIUM PHOSPHATE 20 MG/5ML SOLUTION: Performed by: NURSE ANESTHETIST, CERTIFIED REGISTERED

## 2025-01-10 DEVICE — LIGACLIP MCA MULTIPLE CLIP APPLIERS, 20 MEDIUM CLIPS
Type: IMPLANTABLE DEVICE | Site: BREAST | Status: FUNCTIONAL
Brand: LIGACLIP

## 2025-01-10 RX ORDER — IPRATROPIUM BROMIDE AND ALBUTEROL SULFATE 2.5; .5 MG/3ML; MG/3ML
3 SOLUTION RESPIRATORY (INHALATION) ONCE AS NEEDED
Status: DISCONTINUED | OUTPATIENT
Start: 2025-01-10 | End: 2025-01-10 | Stop reason: HOSPADM

## 2025-01-10 RX ORDER — HYDROCODONE BITARTRATE AND ACETAMINOPHEN 5; 325 MG/1; MG/1
1 TABLET ORAL ONCE AS NEEDED
Status: DISCONTINUED | OUTPATIENT
Start: 2025-01-10 | End: 2025-01-10 | Stop reason: HOSPADM

## 2025-01-10 RX ORDER — HYDROMORPHONE HYDROCHLORIDE 1 MG/ML
0.5 INJECTION, SOLUTION INTRAMUSCULAR; INTRAVENOUS; SUBCUTANEOUS
Status: DISCONTINUED | OUTPATIENT
Start: 2025-01-10 | End: 2025-01-10 | Stop reason: HOSPADM

## 2025-01-10 RX ORDER — EPHEDRINE SULFATE 50 MG/ML
5 INJECTION, SOLUTION INTRAVENOUS ONCE AS NEEDED
Status: DISCONTINUED | OUTPATIENT
Start: 2025-01-10 | End: 2025-01-10 | Stop reason: HOSPADM

## 2025-01-10 RX ORDER — HYDRALAZINE HYDROCHLORIDE 20 MG/ML
5 INJECTION INTRAMUSCULAR; INTRAVENOUS
Status: DISCONTINUED | OUTPATIENT
Start: 2025-01-10 | End: 2025-01-10 | Stop reason: HOSPADM

## 2025-01-10 RX ORDER — PROMETHAZINE HYDROCHLORIDE 25 MG/1
25 TABLET ORAL ONCE AS NEEDED
Status: DISCONTINUED | OUTPATIENT
Start: 2025-01-10 | End: 2025-01-10 | Stop reason: HOSPADM

## 2025-01-10 RX ORDER — ONDANSETRON 2 MG/ML
INJECTION INTRAMUSCULAR; INTRAVENOUS AS NEEDED
Status: DISCONTINUED | OUTPATIENT
Start: 2025-01-10 | End: 2025-01-10 | Stop reason: SURG

## 2025-01-10 RX ORDER — PROCHLORPERAZINE EDISYLATE 5 MG/ML
10 INJECTION INTRAMUSCULAR; INTRAVENOUS EVERY 6 HOURS PRN
Status: DISCONTINUED | OUTPATIENT
Start: 2025-01-10 | End: 2025-01-10 | Stop reason: HOSPADM

## 2025-01-10 RX ORDER — FENTANYL CITRATE 50 UG/ML
50 INJECTION, SOLUTION INTRAMUSCULAR; INTRAVENOUS
Status: DISCONTINUED | OUTPATIENT
Start: 2025-01-10 | End: 2025-01-10 | Stop reason: HOSPADM

## 2025-01-10 RX ORDER — OXYCODONE AND ACETAMINOPHEN 7.5; 325 MG/1; MG/1
1 TABLET ORAL EVERY 4 HOURS PRN
Status: DISCONTINUED | OUTPATIENT
Start: 2025-01-10 | End: 2025-01-10 | Stop reason: HOSPADM

## 2025-01-10 RX ORDER — DIPHENHYDRAMINE HYDROCHLORIDE 50 MG/ML
12.5 INJECTION INTRAMUSCULAR; INTRAVENOUS
Status: DISCONTINUED | OUTPATIENT
Start: 2025-01-10 | End: 2025-01-10 | Stop reason: HOSPADM

## 2025-01-10 RX ORDER — SODIUM CHLORIDE 0.9 % (FLUSH) 0.9 %
10 SYRINGE (ML) INJECTION AS NEEDED
Status: DISCONTINUED | OUTPATIENT
Start: 2025-01-10 | End: 2025-01-10 | Stop reason: HOSPADM

## 2025-01-10 RX ORDER — PROMETHAZINE HYDROCHLORIDE 25 MG/1
25 SUPPOSITORY RECTAL ONCE AS NEEDED
Status: DISCONTINUED | OUTPATIENT
Start: 2025-01-10 | End: 2025-01-10 | Stop reason: HOSPADM

## 2025-01-10 RX ORDER — SODIUM CHLORIDE, SODIUM LACTATE, POTASSIUM CHLORIDE, CALCIUM CHLORIDE 600; 310; 30; 20 MG/100ML; MG/100ML; MG/100ML; MG/100ML
9 INJECTION, SOLUTION INTRAVENOUS CONTINUOUS PRN
Status: DISCONTINUED | OUTPATIENT
Start: 2025-01-10 | End: 2025-01-10 | Stop reason: HOSPADM

## 2025-01-10 RX ORDER — ONDANSETRON 2 MG/ML
4 INJECTION INTRAMUSCULAR; INTRAVENOUS ONCE AS NEEDED
Status: DISCONTINUED | OUTPATIENT
Start: 2025-01-10 | End: 2025-01-10 | Stop reason: HOSPADM

## 2025-01-10 RX ORDER — FLUMAZENIL 0.1 MG/ML
0.2 INJECTION INTRAVENOUS AS NEEDED
Status: DISCONTINUED | OUTPATIENT
Start: 2025-01-10 | End: 2025-01-10 | Stop reason: HOSPADM

## 2025-01-10 RX ORDER — SODIUM CHLORIDE 9 MG/ML
40 INJECTION, SOLUTION INTRAVENOUS AS NEEDED
Status: DISCONTINUED | OUTPATIENT
Start: 2025-01-10 | End: 2025-01-10 | Stop reason: HOSPADM

## 2025-01-10 RX ORDER — PROPOFOL 10 MG/ML
INJECTION, EMULSION INTRAVENOUS AS NEEDED
Status: DISCONTINUED | OUTPATIENT
Start: 2025-01-10 | End: 2025-01-10 | Stop reason: SURG

## 2025-01-10 RX ORDER — ATROPINE SULFATE 0.4 MG/ML
0.4 INJECTION, SOLUTION INTRAMUSCULAR; INTRAVENOUS; SUBCUTANEOUS ONCE AS NEEDED
Status: DISCONTINUED | OUTPATIENT
Start: 2025-01-10 | End: 2025-01-10 | Stop reason: HOSPADM

## 2025-01-10 RX ORDER — SODIUM CHLORIDE 0.9 % (FLUSH) 0.9 %
10 SYRINGE (ML) INJECTION EVERY 12 HOURS SCHEDULED
Status: DISCONTINUED | OUTPATIENT
Start: 2025-01-10 | End: 2025-01-10 | Stop reason: HOSPADM

## 2025-01-10 RX ORDER — LABETALOL HYDROCHLORIDE 5 MG/ML
5 INJECTION, SOLUTION INTRAVENOUS
Status: DISCONTINUED | OUTPATIENT
Start: 2025-01-10 | End: 2025-01-10 | Stop reason: HOSPADM

## 2025-01-10 RX ORDER — DEXAMETHASONE SODIUM PHOSPHATE 4 MG/ML
INJECTION, SOLUTION INTRA-ARTICULAR; INTRALESIONAL; INTRAMUSCULAR; INTRAVENOUS; SOFT TISSUE AS NEEDED
Status: DISCONTINUED | OUTPATIENT
Start: 2025-01-10 | End: 2025-01-10 | Stop reason: SURG

## 2025-01-10 RX ORDER — NALOXONE HCL 0.4 MG/ML
0.2 VIAL (ML) INJECTION AS NEEDED
Status: DISCONTINUED | OUTPATIENT
Start: 2025-01-10 | End: 2025-01-10 | Stop reason: HOSPADM

## 2025-01-10 RX ORDER — MIDAZOLAM HYDROCHLORIDE 1 MG/ML
1 INJECTION, SOLUTION INTRAMUSCULAR; INTRAVENOUS
Status: DISCONTINUED | OUTPATIENT
Start: 2025-01-10 | End: 2025-01-10 | Stop reason: HOSPADM

## 2025-01-10 RX ADMIN — PROPOFOL INJECTABLE EMULSION 150 MCG/KG/MIN: 10 INJECTION, EMULSION INTRAVENOUS at 13:49

## 2025-01-10 RX ADMIN — SODIUM CHLORIDE 2000 MG: 900 INJECTION INTRAVENOUS at 13:31

## 2025-01-10 RX ADMIN — ONDANSETRON 4 MG: 2 INJECTION INTRAMUSCULAR; INTRAVENOUS at 14:20

## 2025-01-10 RX ADMIN — DEXAMETHASONE SODIUM PHOSPHATE 4 MG: 4 INJECTION, SOLUTION INTRAMUSCULAR; INTRAVENOUS at 14:20

## 2025-01-10 RX ADMIN — PROPOFOL INJECTABLE EMULSION 100 MG: 10 INJECTION, EMULSION INTRAVENOUS at 13:54

## 2025-01-10 NOTE — ANESTHESIA PREPROCEDURE EVALUATION
Anesthesia Evaluation     Patient summary reviewed and Nursing notes reviewed   NPO Solid Status: > 8 hours  NPO Liquid Status: > 2 hours           Airway   Mallampati: II  TM distance: >3 FB  Neck ROM: full  No difficulty expected  Dental - normal exam     Pulmonary - negative pulmonary ROS and normal exam   Cardiovascular - normal exam  Exercise tolerance: good (4-7 METS)    (+) hypertension      Neuro/Psych- negative ROS  GI/Hepatic/Renal/Endo - negative ROS     Musculoskeletal (-) negative ROS    Abdominal    Substance History - negative use     OB/GYN negative ob/gyn ROS         Other      history of cancer                Anesthesia Plan    ASA 2     MAC     intravenous induction     Anesthetic plan, risks, benefits, and alternatives have been provided, discussed and informed consent has been obtained with: patient.    CODE STATUS:

## 2025-01-10 NOTE — OP NOTE
BREAST LUMPECTOMY WITH KAYLEY LOCALIZATION  Procedure Report    Patient Name:  Lilaim Collins  YOB: 1960    Date of Surgery:  1/10/2025       Pre-op Diagnosis:   Atypical ductal hyperplasia of right breast [N60.91]       Post-Op Diagnosis Codes:     * Atypical ductal hyperplasia of right breast [N60.91]      Procedure(s):  Right breast partial mastectomy KAYLEY guided      Staff:  Surgeon(s):  Namrata Eugene MD         Anesthesia: Monitored Anesthesia Care    Estimated Blood Loss: minimal    Implants:    Implant Name Type Inv. Item Serial No.  Lot No. LRB No. Used Action   CLIPAPPLR M/ ENDO LIGACLIP9 3/8IN MD - [id number] Implant CLIPAPPLR M/ ENDO LIGACLIP9 3/8IN MD  [employer]  DIV OF J [employer] J [id number] Right 1 Implanted       Specimen:          Specimens       ID Source Type Tests Collected By Collected At Frozen?    A Breast, Right Tissue TISSUE PATHOLOGY EXAM   Namrata Eugene MD 1/10/25 1406 No    Description: Right Breast Partial Mastectomy, Short Stitch - Superior, Long Stitch - Lateral, Ink - Posterior, Fresh for Permanent    Comment: Fresh For Permanent    B Breast, Right Tissue TISSUE PATHOLOGY EXAM   Namrata Eugene MD 1/10/25 1438 No    Description: Right Breast Lateral Margin, Stitch Zheng True Margin, Fresh for Permanent    Comment: Fresh for Permanent                Findings: kayley within initial portion of excision, clip within lateral margin    Complications: none    Description of Procedure:   The risks and benefits of the procedure were explained in detail to the patient.  Informed consent was obtained.  Prior to arrival, the patient had a radiographically localizing KAYLEY placed for proper identification of the breast lesion in question.  The patient was then taken to the operating room and placed supine on the operating room table.  Sequential compression devices were applied to the lower extremities and preoperative antibiotics administered. A time out was then performed to  identify the proper patient, procedure, and location.     After the administration of adequate anesthesia, the right breast was prepped and draped in the standard surgical fashion.  A curviliniear incision was then made on the upper outer quadrant of the right breast based on the mammographic images and the positioning of the localizing KAYLEY.  Dissection was carried through the skin to the underlying subcutaneous tissues.  Sharp dissection with the use of electrocautery was utilized to dissect down to the level of the clip.  The region was dissected free from the rest of the breast and removed, care taken to ensure that the KAYLEY was intact and the lesion in question was positioned near the center of the resected specimen.  The specimen was then marked short stitch superiorly, long stitch laterally, ink to posterior.  Intraoperative specimen radiograph then confirmed that the kayley within the previously specimen. An additional lateral margin was excised and marked, stitch marks new margin - and intraoperative radiography confirmed the previously placed biopsy clip was within the specimen.  Hemostasis ensured.  The underlying breast tissue was re-approximated with a 2-0 Vicryl suture. The incision was then closed in two layers with absorbable suture (3-0 Vicryl deep dermal and 4-0 Monocryl subcuticular) and Dermabond applied.  The patient tolerated the procedure well and was transported to the post anesthesia care unit in stable condition.          This procedure was not performed to treat breast cancer through sentinel node biopsy        Namrata Eugene MD     Date: 1/10/2025  Time: 15:22 EST

## 2025-01-10 NOTE — H&P
[facility]     Referring Provider: Namrata Eugene MD     Chief complaint: newly diagnosed atypical hyperplasia     Subjective   HPI: Ms. Liliam Collins is a 64 y.o. yo woman, seen at the request of Namrata Eugene MD, for a new diagnosis of right breast ADH.      This was initially detected as an imaging abnormality on routine screening. She denies any changes to her breast, no masses, skin changes or nipple discharge. She has previoulsy undergone 1 previous breast biopsy on the left side.     She reports a pertinent PMH significant for HTN, Restless Leg Syndrome.     She denies any family history of breast or ovarian cancer.     She was joined today in clinic by her wife. She gave consent for her wife to be present during her examination and participate in the discussion.     HPI 1/10/25  Completed additional imaging. Biopsied area is benign.  Ready for OR today - partial mastectomy for right ADH        Review of Systems   Constitutional: Negative.  Negative for appetite change, fatigue and fever.   HENT:   Positive for hearing loss and tinnitus.    Eyes: Negative.    Respiratory: Negative.  Negative for cough and shortness of breath.    Cardiovascular: Negative.    Gastrointestinal: Negative.  Negative for abdominal distention, diarrhea and nausea.   Endocrine: Negative.    Genitourinary: Negative.     Musculoskeletal: Negative.  Negative for arthralgias and back pain.   Skin: Negative.    Neurological: Negative.  Negative for dizziness, headaches and speech difficulty.   Hematological: Negative.    Psychiatric/Behavioral: Negative.  Negative for decreased concentration and sleep disturbance.        Medications:    Current Facility-Administered Medications:     ceFAZolin 2000 mg IVPB in 100 mL NS (MBP), 2,000 mg, Intravenous, Once, Namrata Eugene MD    lactated ringers infusion, 9 mL/hr, Intravenous, Continuous PRN, Dakota Desai MD    midazolam (VERSED) injection 1 mg, 1 mg, Intravenous, Q10 Min PRN,  Dakota Desai MD    sodium chloride 0.9 % flush 10 mL, 10 mL, Intravenous, Q12H, Dakota Desai MD    sodium chloride 0.9 % flush 10 mL, 10 mL, Intravenous, PRN, Dakota Desai MD    sodium chloride 0.9 % infusion 40 mL, 40 mL, Intravenous, PRN, Dakota Desai MD    Allergies:  Allergies   Allergen Reactions    Lisinopril Cough    Peanut (Diagnostic) Hives, Itching, Rash and Swelling       Medical history:  Past Medical History:   Diagnosis Date    Atypical hyperplasia of breast     RIGHT    Hypertension     RLS (restless legs syndrome)        Surgical History:  Past Surgical History:   Procedure Laterality Date    COLONOSCOPY      CYST REMOVAL Right     ARMPIT       Family History:  Family History   Problem Relation Age of Onset    Melanoma Mother     Malig Hyperthermia Neg Hx        Cancer Family History: Age of diagnosis/Age at death OR Age as of today  Breast Cancer: Denies  Ovarian Cancer: Denies  Pancreatic Cancer: Denies  Prostate Cancer: Denies  Colon Cancer: Denies  Lung Cancer: Denies     Social History:   Social History     Socioeconomic History    Marital status:    Tobacco Use    Smoking status: Never    Smokeless tobacco: Never   Vaping Use    Vaping status: Never Used   Substance and Sexual Activity    Alcohol use: Never    Drug use: Never       She lives with her wife.  She is employed as a .       GYNECOLOGIC HISTORY:   . P: 0. AB: 0.  Last menstrual period:   Age at menarche: 15  Age at first childbirth: N/A  Lactation: N/A  Age at menopause: 52  Total years of oral contraceptive use: 0  Total years of hormone replacement therapy: 0      Objective   PHYSICAL EXAMINATION  This exam was chaperoned by: [place]  /86 (BP Location: Right arm, Patient Position: Lying)   Pulse 83   Temp 98.1 °F (36.7 °C) (Oral)   Resp 18   SpO2 94%   ECOG 0 - Asymptomatic  General: NAD, well appearing  Psych: a&o x 3, normal mood and affect  Eyes: EOMI, no scleral icterus  ENMT:  neck supple without masses or thyromegaly, mucus membranes moist  Resp: normal effort  CV: RRR, no edema   GI: soft, NT, ND  MSK: normal gait, normal ROM in bilateral shoulders  Lymph nodes:  no cervical, supraclavicular or axillary lymphadenopathy  Breast: symmetric, Bra 44DD, grade 3 ptosis  Right:  No visible abnormalities on inspection while seated, with arms raised or hands on hips. No masses, skin changes, or nipple abnormalities.  Left:  No visible abnormalities on inspection while seated, with arms raised or hands on hips. No masses, skin changes, or nipple abnormalities.      Imaging - documented images below have been personally reviewed:  10/24/24 Bilateral Screening Mammogram  Scattered areas of fibroglandular density.  Calcifications measuring 4 mm seen in the posterior one third upper outer region of the right breast.  Left breast, no suspicious masses, significant calcifications or other abnormalities are seen.  Impression: Calcifications in the right breast require additional evaluation  BI-RADS 0    10/31/2024 right diagnostic mammogram  Additional evaluation for the calcifications in the right breast seen 10/24/2024.  On present there are amorphous calcifications with grouped distribution in the posterior one third upper outer region of the right breast.  Impression: Calcifications in the right breast are suspicious stereotactic biopsy is recommended  BI-RADS 4A    11/20/2024 stereotactic biopsy  Patient's right breast upper outer quadrant position was isolated and the calcifications were localized.  Using a superior approach and a 9 gauge vacuum-assisted device 12 core needle biopsy specimens were obtained.  A Top-Hat shaped biopsy clip was placed within the biopsy bed.  2 view postprocedure mammogram shows clip within the biopsy bed.  Pathology shows atypical ductal hyperplasia. Pathology is concordant.     12/11/24 Breast MRI ([facility])  FINDINGS: There is scattered fibroglandular tissue. There is  mild  background parenchymal enhancement.     RIGHT BREAST:    At 10:00 in the posterior right breast, 9.3 cm posterior to the nipple,  there is a 2.6 cm vertically oriented linear biopsy tract/cavity with a  focus of susceptibility from a biopsy clip within the inferior aspect of  the tract/cavity marking the site of biopsy-proven atypia. No suspicious  enhancing mass or area of non-mass enhancement is identified at the  biopsy site or elsewhere within the right breast.  The visualized axilla is within normal limits.     LEFT BREAST:    At 11-12 o'clock in the middle to anterior left breast, 3.5 cm posterior  to the nipple, there is a 0.7 x 0.8 x 0.5 cm enhancing mass, which  appears to directly abut a prominent blood vessel. This potentially  reflect a lymph node. No definite correlate is identified on mammogram.  No other suspicious enhancing mass or area of non-mass enhancement is  identified.  The visualized axilla is within normal limits.     EXTRAMAMMARY FINDINGS:  There are no pathologically enlarged internal mammary chain lymph nodes  on either side.       IMPRESSION AND RECOMMENDATION:  1.  A biopsy tract/cavity and biopsy clip at 10:00 in the posterior  right breast represent the site of biopsy-proven atypia. Recommend  surgical management.  2.  A 0.8 cm enhancing mass at 11-12 o'clock in the left breast  potentially reflect a lymph node but is incompletely assessed. Recommend  targeted ultrasound of this area, followed by possible percutaneous  biopsy or short-term follow-up imaging, pending the sonographic  appearance.     BI-RADS Category 0    12/26/24 Left breast US ([facility])  Targeted sonographic evaluation of the left breast was performed in the  region of the abnormality on recent MRI. At 12:00, 4 cm from the nipple,  there is a 0.7 x 0.5 x 0.7 cm mass with a prominent adjacent blood  vessel corresponding to the mass of interest on MRI. The appearance is  not classic for a lymph node. This is low  suspicion for malignancy.  Recommend further evaluation with ultrasound-guided core needle biopsy.  BI-RADS Category 4      1/3/25 left US guided biopsy ([facility])  Left breast at 12:00 mass was visualized, 7 cores were obtained with a 12 gauge biopsy device. A tophat clip was placed and two view mammogram showed clip in expected location  Pathology returned as apocrine cysts, columnar cell changes and usual ductal hyperplasia. Pathology is benign and concordant.     Pathology:  11/20/24  1.  Breast, right upper outer quadrant, stereotactic core biopsy for calcifications: (Top-Hat clip)               A.  Atypical ductal hyperplasia with associated microcalcifications.               B.  Sclerosing adenosis.    1/3/25  1.  Breast, left, 12:00, biopsy (Top-Hat clip marker): Benign breast tissue with               -A.  Clustered apocrine cysts               -B.  Columnar cell change   - C.  Moderate ductal hyperplasia the usual type    Assessment & Plan   Assessment:  64 y.o. F with a new diagnosis of right breast Atypical Ductal Hyperplasia.     Discussion:  We discussed the clinical significance of ADH in terms of the potential for identifying associated malignancy at time of excision as well as its being a risk marker for future breast cancer in either breast.     We discussed management options in terms of ADH being a potential precursor, high risk benign lesion including complete surgical removal to evaluate for associated malignancy as well as surveillance. The partial mastectomy procedure was explained. Potential risks of surgery including bleeding, infection, hematoma and scar were reviewed and we also discussed the potential need to return to the operating room in the event of close margins or malignancy were identified in the surgical specimen in which case a definitive cancer operation would be recommended.     We further reviewed that atypical hyperplasia is considered a risk factor for future development of a  breast cancer in either breast, with the associated risk being approximately 2-4-fold general population risk (10-year estimated risk of ~17-26%; Breast Cancer Res Treat (2012) 136:627-633). We discussed recommendations for high risk breast surveillance going forward, with the goal of early detection should she develop a breast cancer. This would include annual mammography as well as annual breast mri, alternating every 6 months and combined with breast exams. In terms of chemoprevention, briefly discussed one of the original prevention trials that noted a 44-55% (overall, 3.7% versus 1.8%) reduction in breast cancer development at 5-years of tamoxifen use ([facility] (1998) 90:1371-88). Noted that the benefit for individuals with atypical ductal hyperplasia (ADH) or atypical lobular breast hyperplasia (ALH) is even higher, estimated to reduce the risk of breast cancer development over 10 years from 21.3% down to 7.5% (Breast Cancer Res Treat (2012) 136:627-633). This benefit is cancer development but does not affect overall survival. We also discussed that the actual number of individuals receiving benefit must be weighed against the risks of tamoxifen which include vascular related events (stroke 1.59 times risk, pulmonary embolism 3 times risk and deep vein thrombosis 1.6 times risk versus placebo) and endometrial cancer (2.53 times increased risk versus placebo)(J [facility] (1998) 90:1371-88). The risk of PE in more modern series can range from 1-2% per year, but usually drops after the first 24-36 months of therapy. We also discussed that alternate approaches can include aromatase inhibitors (when postmenopausal) and possible low tamoxifen therapy (5 mg daily or 10 mg every other day) as less common approaches that are being studied.         Plan:    - Surgical plans right breast partial mastectomy, kayley guided - remove remaining calcifications and ensure no adjacent cancer.  - OR today for ADH  excision  - medical oncology for discussion of chemoprevention - referral will be discussed post op pending final surgical pathology  - annual mammography alternating with annual MRI, will obtain one preop  - valium for cluastrophobia  - follow-up breast surgical oncology for clinical exam 1-2 times per year      Namrata Eugene MD  Breast Surgical Oncology

## 2025-01-10 NOTE — DISCHARGE INSTRUCTIONS
Excisional Biopsy OR Partial Mastectomy    Your Surgery:  Depending on the location and size of your abnormal tissue, you will wither have an incision near the mass or around part of your nipple.   The surgery removes the abnormal breast tissues and some of the normal surrounding tissue. The pathologists will examine the tissue to make a final diagnosis and evaluate the edges.  Your incision will be closed with stitches that will dissolve on their own. The incision will be closed with surgical glue. This glue will fall off in several days.  After Your Surgery  Do not drive or make important decisions the day of surgery.  Someone should stay with you the rest of the day and overnight.  Wear your surgical bra, compression bra or ace bandage wrap until your follow up appointment. OK to remove it to shower.  You may shower 24 hours after surgery. Do not scrub the incision, let soapy water run down over the incision, then pat dry. Keep the incision clean and dry. No baths, pools, or spas until your follow up appointment.   OK to use ice packs for pain control. Please do not use heating pads.    Diet/Activity  OK to eat a regular diet. Do not drink alcohol while taking pain medication.  No lifting, pushing, pulling anything over 5 lbs - avoid sports, heavy work, stair climbers, or elliptical machines.   To reduce the risk of blood clots, get up and move every 90 minutes during the day. Do not lay or sit in one position for more than 90 minutes.     Medications:  Please take Ibuprofen and Acetaminophen for pain control. You can alternate with 600 mg of ibuprofen every 6 hours then, 650 mg of acetaminophen every 6 hours.  For Example:  9 am: Acetaminophen  12 pm (noon): Ibuprofen  3pm: Acetaminophen  6 pm: Ibuprofen  9pm: Acetaminophen  Midnight: Ibuprofen  3am: Acetaminophen  IF you are taking prescription pain medication, take stool softners to prevent constipation.   Reasons to call the office or your Surgeon:  If you  notice: redness, swelling, odor or drainage from the incision  If you have a fever greater than 101F (38C)  A small amount of bloody/blood-tinged fluid from your incision is normal. If there is excessive bleeding, please call the office.     Namrata Eugene MD  [facility] [place] [address] [phone]

## 2025-01-13 ENCOUNTER — TELEPHONE (OUTPATIENT)
Dept: SURGERY | Facility: CLINIC | Age: 65
End: 2025-01-13
Payer: COMMERCIAL

## 2025-01-13 LAB
CYTO UR: NORMAL
LAB AP CASE REPORT: NORMAL
LAB AP CLINICAL INFORMATION: NORMAL
LAB AP DIAGNOSIS COMMENT: NORMAL
LAB AP SPECIAL STAINS: NORMAL
PATH REPORT.FINAL DX SPEC: NORMAL
PATH REPORT.GROSS SPEC: NORMAL

## 2025-01-15 ENCOUNTER — TELEPHONE (OUTPATIENT)
Dept: SURGERY | Facility: CLINIC | Age: 65
End: 2025-01-15
Payer: COMMERCIAL

## 2025-01-15 NOTE — TELEPHONE ENCOUNTER
Attempted to reschedule the appointment with the patient twice but call was disconnected twice by the patient.   Rescheduled the patient to 01/21/25 @ 10:45 am and sent this information to the patient via MY CHART.

## 2025-01-21 ENCOUNTER — OFFICE VISIT (OUTPATIENT)
Dept: SURGERY | Facility: CLINIC | Age: 65
End: 2025-01-21
Payer: COMMERCIAL

## 2025-01-21 VITALS
DIASTOLIC BLOOD PRESSURE: 78 MMHG | HEART RATE: 78 BPM | OXYGEN SATURATION: 100 % | BODY MASS INDEX: 31.99 KG/M2 | WEIGHT: 216 LBS | HEIGHT: 69 IN | SYSTOLIC BLOOD PRESSURE: 132 MMHG

## 2025-01-21 DIAGNOSIS — N60.91 ATYPICAL DUCTAL HYPERPLASIA OF RIGHT BREAST: Primary | ICD-10-CM

## 2025-01-21 PROCEDURE — 99024 POSTOP FOLLOW-UP VISIT: CPT | Performed by: STUDENT IN AN ORGANIZED HEALTH CARE EDUCATION/TRAINING PROGRAM

## 2025-01-21 NOTE — PROGRESS NOTES
Breast Surgical Oncology Post-Op Visit    Surgery:  right partial mastectomy  Subjective: No acute issues. Denies f/c/wnd issues. Intermittent use of pain medication.     O:  Vitals:    01/21/25 1029   BP: 132/78   Pulse: 78   SpO2: 100%     Breast incision:  healing well without evidence of infection or significant seroma      Pathology:   Final Diagnosis   Date Value Ref Range Status   01/10/2025   Final    1.  Breast, right, partial mastectomy: Benign breast tissue with   -A.  Radial scar   -B.  Moderate ductal hyperplasia the usual type   -C.  Microcalcifications in nonneoplastic tissue   -D.  Biopsy site changes and clip not identified   -E.  Margins free of atypia, in situ and invasive disease    2.  Breast, right, lateral margin, excision: Breast tissue with   -A.  Atypical ductal hyperplasia   -B.  Microcalcifications in nonneoplastic tissue   -C.  Columnar cell change present   -D.  Margins free of atypia, in situ and invasive disease   -E.  Top-Hat clip and biopsy site changes identified   -F.  Margins free of atypia, in situ and invasive disease                Assessment: for s/p right partial mastectomy for ADH. Discussed pathology at length as well as planned/possible adjuvant treatments.  All questions answered.        Plan:   - RTC 6 months for clinical exam, follow-up medical oncology recommendations and toleration of therapy  - Next imaging due, mammo October 2025 at [facility], breast MRI January 2026.  Will need Valium for MRI.  - Med Onc referral discussed risk reduction, previously discussed this recommendation at her preop appointment        Namrata Eugene MD  Breast Surgical Oncology    Cc Drs:  Zach Ash       No follow-ups on file.

## 2025-01-21 NOTE — LETTER
January 21, 2025     Pina Hill MD  [address]  [address]  [place] [facility] [address]    Patient: Liliam Collins   YOB: 1960   Date of Visit: 1/21/2025     Dear Pina Hill MD:       Thank you for referring Liliam Collins to me for evaluation. Below are the relevant portions of my assessment and plan of care.    If you have questions, please do not hesitate to call me. I look forward to following Liliam along with you.         Sincerely,        Namrata Eugene MD        CC: MD Yoko Sherman MD Couch, MD Namrata  01/21/25 1638  Sign when Signing Visit  Breast Surgical Oncology Post-Op Visit    Surgery:  right partial mastectomy  Subjective: No acute issues. Denies f/c/wnd issues. Intermittent use of pain medication.     O:  Vitals:    01/21/25 1029   BP: 132/78   Pulse: 78   SpO2: 100%     Breast incision:  healing well without evidence of infection or significant seroma      Pathology:   Final Diagnosis   Date Value Ref Range Status   01/10/2025   Final    1.  Breast, right, partial mastectomy: Benign breast tissue with   -A.  Radial scar   -B.  Moderate ductal hyperplasia the usual type   -C.  Microcalcifications in nonneoplastic tissue   -D.  Biopsy site changes and clip not identified   -E.  Margins free of atypia, in situ and invasive disease    2.  Breast, right, lateral margin, excision: Breast tissue with   -A.  Atypical ductal hyperplasia   -B.  Microcalcifications in nonneoplastic tissue   -C.  Columnar cell change present   -D.  Margins free of atypia, in situ and invasive disease   -E.  Top-Hat clip and biopsy site changes identified   -F.  Margins free of atypia, in situ and invasive disease                Assessment: for s/p right partial mastectomy for ADH. Discussed pathology at length as well as planned/possible adjuvant treatments.  All questions answered.        Plan:   - RTC 6 months for clinical exam, follow-up medical oncology  recommendations and toleration of therapy  - Next imaging due, mammo October 2025 at [facility], breast MRI January 2026.  Will need Valium for MRI.  - Med Onc referral discussed risk reduction, previously discussed this recommendation at her preop appointment        Namrata Eugene MD  Breast Surgical Oncology    Cc Samantha:  Zach Ash       No follow-ups on file.

## 2025-01-22 ENCOUNTER — TELEPHONE (OUTPATIENT)
Dept: SURGERY | Facility: CLINIC | Age: 65
End: 2025-01-22
Payer: COMMERCIAL

## 2025-01-22 NOTE — TELEPHONE ENCOUNTER
Informed patient of her follow up with Dr. Eugene 7/24/2025 at 9:30 am.     Patient confirmed understanding of date and time.   EAS

## 2025-02-26 NOTE — PROGRESS NOTES
Appointment (New Oncology )        REASON FOR CONSULTATION: Right breast atypical ductal hyperplasia                              REQUESTING PHYSICIAN: Namrata Eugene MD  RECORDS OBTAINED:  Records of the patients history including those from the electronic medical record were reviewed and summarized in detail.    HISTORY OF PRESENT ILLNESS:  The patient is a 64 y.o. year old female with HTN, newly diagnosed right breast atypical hyperplasia, oncologic history outlined below.  Referred to our clinic for discussion on chemoprevention.  Today she is here accompanied by her wife.  She has healed quite well from the surgery.  She is contemplating knee surgery.  Denies any new concerns or complaints.  No family history of breast cancer.  She is nulliparous.      Oncology history  10/24/2024 screening mammogram-calcification in right breast require additional evaluation.  BI-RADS 0  10/31/2024 right breast diagnostic mammogram-amorphous calcifications with grouped distribution in posterior one third upper outer region of right breast.  Calcifications in right breast suspicious.  Stereotactic biopsy recommended.  BI-RADS 4 8  11/20/2024 breast right upper quadrant stereotactic core biopsy for calcification-atypical ductal hyperplasia with associated microcalcifications.  Sclerosing adenosis.  1/3/2025 left breast 12:00 biopsy-benign breast tissue with clustered apocrine cyst, columnar cell change.  Moderate ductal hyperplasia of the usual type  1/10/2025 status post right breast partial mastectomy-radial scar.  Moderate ductal hyperplasia of the usual type. Right lateral margin excision-atypical ductal hyperplasia.  Microcalcifications in nonneoplastic tissue.  Margins free of atypia, in situ and invasive disease    Family history  Mother - skin cancer    Gyn history:  Age at first childbirth - n/a  Age at menarche - 14 years  Lactation/how long - n/a  Age at menopause - 53 (s/p ablation 2011 for menorrhagia)  Total  "years of oral contraceptive use - no  Total years of hormone replacement therapy -no    Past Medical History:   Diagnosis Date    Atypical hyperplasia of breast     RIGHT    Hypertension     RLS (restless legs syndrome)      Past Surgical History:   Procedure Laterality Date    BREAST LUMPECTOMY Right 1/10/2025    Procedure: Right breast partial mastectomy VERONICA guided;  Surgeon: Namrata Eugene MD;  Location: [facility];  Service: General;  Laterality: Right;    COLONOSCOPY      CYST REMOVAL Right     ARMPIT       MEDICATIONS    Current Outpatient Medications:     amLODIPine (NORVASC) 10 MG tablet, Take 1 tablet by mouth Daily., Disp: , Rfl:     losartan (COZAAR) 100 MG tablet, Take 1 tablet by mouth Daily., Disp: , Rfl:     rOPINIRole (REQUIP) 1 MG tablet, Take 1 tablet by mouth Every Evening., Disp: , Rfl:     ALLERGIES:     Allergies   Allergen Reactions    Lisinopril Cough    Peanut (Diagnostic) Hives, Itching, Rash and Swelling       SOCIAL HISTORY:       Social History     Socioeconomic History    Marital status:    Tobacco Use    Smoking status: Never    Smokeless tobacco: Never   Vaping Use    Vaping status: Never Used   Substance and Sexual Activity    Alcohol use: Never    Drug use: Never         FAMILY HISTORY:  Family History   Problem Relation Age of Onset    Melanoma Mother     Malig Hyperthermia Neg Hx        REVIEW OF SYSTEMS:  Review of Systems   Constitutional: Negative.    HENT: Negative.     Respiratory: Negative.     Cardiovascular: Negative.    Gastrointestinal: Negative.    Genitourinary: Negative.    Hematological: Negative.               Vitals:    02/27/25 1446   BP: 151/88   Pulse: 75   Temp: 97.5 °F (36.4 °C)   TempSrc: Oral   SpO2: 97%   Weight: 98.8 kg (217 lb 12.8 oz)   Height: 174 cm (68.5\")   PainSc: 0-No pain         2/27/2025     2:30 PM   Current Status   ECOG score 0        PHYSICAL EXAM:    Physical Exam  Constitutional:       Appearance: Normal appearance.   HENT:      " Head: Normocephalic and atraumatic.      Mouth/Throat:      Mouth: Mucous membranes are moist.      Pharynx: Oropharynx is clear.   Eyes:      Extraocular Movements: Extraocular movements intact.      Pupils: Pupils are equal, round, and reactive to light.   Cardiovascular:      Rate and Rhythm: Normal rate and regular rhythm.   Pulmonary:      Effort: Pulmonary effort is normal.      Breath sounds: Normal breath sounds.   Chest:   Breasts:     Right: No mass, nipple discharge or skin change.      Left: No mass, nipple discharge or skin change.   Abdominal:      General: Bowel sounds are normal.      Palpations: Abdomen is soft.   Musculoskeletal:      Cervical back: Normal range of motion and neck supple.   Lymphadenopathy:      Upper Body:      Right upper body: No axillary adenopathy.      Left upper body: No axillary adenopathy.   Neurological:      General: No focal deficit present.      Mental Status: She is alert and oriented to person, place, and time.   Psychiatric:         Mood and Affect: Mood normal.         Behavior: Behavior normal.         RECENT LABS:        WBC   Date Value Ref Range Status   02/27/2025 8.31 3.40 - 10.80 10*3/mm3 Final   01/03/2025 8.32 3.40 - 10.80 10*3/mm3 Final   12/28/2023 6.65 4.5 - 11.0 10*3/uL Final   12/29/2022 6.42 4.5 - 11.0 10*3/uL Final   12/09/2021 6.46 4.5 - 11.0 10*3/uL Final   06/17/2021 6.08 4.5 - 11.0 10*3/uL Final   12/17/2020 7.26 4.5 - 11.0 10*3/uL Final   03/12/2020 6.70 4.5 - 11.0 10*3/uL Final   11/22/2019 6.32 4.5 - 11.0 10*3/uL Final   12/06/2018 5.4 4.5 - 11.0 10*3/uL Final     Hemoglobin   Date Value Ref Range Status   02/27/2025 13.6 12.0 - 15.9 g/dL Final   01/03/2025 13.8 12.0 - 15.9 g/dL Final   12/28/2023 14.1 12.0 - 16.0 g/dL Final   12/29/2022 14.1 12.0 - 16.0 g/dL Final   12/09/2021 14.1 12.0 - 16.0 g/dL Final   06/17/2021 14.7 12.0 - 16.0 g/dL Final   12/17/2020 14.2 12.0 - 16.0 g/dL Final   03/12/2020 14.3 12.0 - 16.0 g/dL Final   11/22/2019  15.8 12.0 - 16.0 g/dL Final   12/06/2018 13.2 12.0 - 16.0 g/dL Final     Platelets   Date Value Ref Range Status   02/27/2025 290 140 - 450 10*3/mm3 Final   01/03/2025 301 140 - 450 10*3/mm3 Final   12/28/2023 287 140 - 440 10*3/uL Final   12/29/2022 285 140 - 440 10*3/uL Final   12/09/2021 283 140 - 440 10*3/uL Final   06/17/2021 288 140 - 440 10*3/uL Final   12/17/2020 268 140 - 440 10*3/uL Final   03/12/2020 258 140 - 440 10*3/uL Final   11/22/2019 221 140 - 440 10*3/uL Final   12/06/2018 248 150 - 450 10*3/uL Final       Pathology:  Tissue Pathology Exam (01/10/2025 14:06)   Tissue Pathology Exam (01/03/2025 13:36)   Tissue Pathology Exam (11/20/2024 11:12)     Imaging:  MAMMO Scan (11/22/2024)   MAMMO Scan (11/22/2024)  MAMMO Scan (11/22/2024)    Assessment:  *Atypical ductal hyperplasia, right breast  *Cleo 5-year risk 0.9%  10/24/2024 screening mammogram-calcification in right breast require additional evaluation.  BI-RADS 0  10/31/2024 right breast diagnostic mammogram-amorphous calcifications with grouped distribution in posterior one third upper outer region of right breast.  Calcifications in right breast suspicious.  Stereotactic biopsy recommended.  BI-RADS 4 8  11/20/2024 breast right upper quadrant stereotactic core biopsy for calcification-atypical ductal hyperplasia with associated microcalcifications.  Sclerosing adenosis.  1/3/2025 left breast 12:00 biopsy-benign breast tissue with clustered apocrine cyst, columnar cell change.  Moderate ductal hyperplasia of the usual type  1/10/2025 status post right breast partial mastectomy-radial scar.  Moderate ductal hyperplasia of the usual type. Right lateral margin excision-atypical ductal hyperplasia.  Microcalcifications in nonneoplastic tissue.  Margins free of atypia, in situ and invasive disease  2/27/2025: Patient establish care with me. Atypical ductal hyperplasia and atypical lobular hyperplasia carries a substantial increase in the risk of  subsequent breast cancer (relative risk of 3.7 to 5.3). There is an increase in the risk of both ipsilateral and contralateral breast cancer. In a report from the Nurse's Health Study, 56% of cancers that developed in women with AH occurred in the ipsilateral breast. Invasive breast cancer is 3 times more likely to occur in the ipsilateral breast after ALH is diagnosed compared to the contralateral breast. The cumulative incidence of breast cancer over 30 years approached 35%. Tamoxifen given for 5 years can reduce the risk of breast cancer by 50%. Aromatase inhibitors such as anastrozole and exemestane can reduce the risk of invasive breast cancer by50% compared to placebo in patients with atypical hyperplasia. However, we do not have any data comparing aromatase inhibitors and tamoxifen in atypical hyperplasia. We discussed the side effects of anastrozole including joint pain, hot flashes, other menopausal symptoms and loss of bone mineral density. We also discussed the side effects of tamoxifen including hot flashes, changes in the lens of the eye, increased risk of clotting (0.8%-1.7%) and increased risk of uterine cancer (4%). We also discussed that tamoxifen can increase bone density and improve cholesterol and lipids    *Bone health  Patient reports she had a most recent [facility] scan in January 20[facility] and was unremarkable.  We will obtain records for our review    Plan  Obtain records of DEX[facility] scan [facility] 2025 [facility] for review  Start Arimidex 1 mg daily for chemoprophylaxis.  Side effects reviewed.  Information provided in after visit summary  Screening mammogram due in October 2025; MRI breast in January 2026.  Ordered by Dr. Eugene's office  MD visit in 2 months with labs for toxicity check

## 2025-02-27 ENCOUNTER — LAB (OUTPATIENT)
Dept: LAB | Facility: HOSPITAL | Age: 65
End: 2025-02-27
Payer: COMMERCIAL

## 2025-02-27 ENCOUNTER — CONSULT (OUTPATIENT)
Dept: ONCOLOGY | Facility: CLINIC | Age: 65
End: 2025-02-27
Payer: COMMERCIAL

## 2025-02-27 VITALS
OXYGEN SATURATION: 97 % | SYSTOLIC BLOOD PRESSURE: 151 MMHG | WEIGHT: 217.8 LBS | TEMPERATURE: 97.5 F | HEART RATE: 75 BPM | BODY MASS INDEX: 32.26 KG/M2 | HEIGHT: 69 IN | DIASTOLIC BLOOD PRESSURE: 88 MMHG

## 2025-02-27 DIAGNOSIS — C50.919 MALIGNANT NEOPLASM OF FEMALE BREAST, UNSPECIFIED ESTROGEN RECEPTOR STATUS, UNSPECIFIED LATERALITY, UNSPECIFIED SITE OF BREAST: Primary | ICD-10-CM

## 2025-02-27 DIAGNOSIS — N60.91 ATYPICAL DUCTAL HYPERPLASIA OF RIGHT BREAST: Primary | ICD-10-CM

## 2025-02-27 LAB
BASOPHILS # BLD AUTO: 0.03 10*3/MM3 (ref 0–0.2)
BASOPHILS NFR BLD AUTO: 0.4 % (ref 0–1.5)
DEPRECATED RDW RBC AUTO: 40.4 FL (ref 37–54)
EOSINOPHIL # BLD AUTO: 0.14 10*3/MM3 (ref 0–0.4)
EOSINOPHIL NFR BLD AUTO: 1.7 % (ref 0.3–6.2)
ERYTHROCYTE [DISTWIDTH] IN BLOOD BY AUTOMATED COUNT: 12.4 % (ref 12.3–15.4)
HCT VFR BLD AUTO: 40.9 % (ref 34–46.6)
HGB BLD-MCNC: 13.6 G/DL (ref 12–15.9)
IMM GRANULOCYTES # BLD AUTO: 0.03 10*3/MM3 (ref 0–0.05)
IMM GRANULOCYTES NFR BLD AUTO: 0.4 % (ref 0–0.5)
LYMPHOCYTES # BLD AUTO: 1.98 10*3/MM3 (ref 0.7–3.1)
LYMPHOCYTES NFR BLD AUTO: 23.8 % (ref 19.6–45.3)
MCH RBC QN AUTO: 29.5 PG (ref 26.6–33)
MCHC RBC AUTO-ENTMCNC: 33.3 G/DL (ref 31.5–35.7)
MCV RBC AUTO: 88.7 FL (ref 79–97)
MONOCYTES # BLD AUTO: 0.74 10*3/MM3 (ref 0.1–0.9)
MONOCYTES NFR BLD AUTO: 8.9 % (ref 5–12)
NEUTROPHILS NFR BLD AUTO: 5.39 10*3/MM3 (ref 1.7–7)
NEUTROPHILS NFR BLD AUTO: 64.8 % (ref 42.7–76)
NRBC BLD AUTO-RTO: 0 /100 WBC (ref 0–0.2)
PLATELET # BLD AUTO: 290 10*3/MM3 (ref 140–450)
PMV BLD AUTO: 10.4 FL (ref 6–12)
RBC # BLD AUTO: 4.61 10*6/MM3 (ref 3.77–5.28)
WBC NRBC COR # BLD AUTO: 8.31 10*3/MM3 (ref 3.4–10.8)

## 2025-02-27 PROCEDURE — 85025 COMPLETE CBC W/AUTO DIFF WBC: CPT

## 2025-02-27 PROCEDURE — 36415 COLL VENOUS BLD VENIPUNCTURE: CPT

## 2025-02-27 RX ORDER — ANASTROZOLE 1 MG/1
1 TABLET ORAL DAILY
Qty: 30 TABLET | Refills: 2 | Status: SHIPPED | OUTPATIENT
Start: 2025-02-27 | End: 2025-05-28

## 2025-04-23 NOTE — PROGRESS NOTES
Follow-up    4/24/2025, interval history:   Patient here for follow-up.  Accompanied by her spouse.  Doing quite well.  Tolerating Arimidex without much issues.  Has occasional hot flashes.  No other issues    HISTORY OF PRESENT ILLNESS:  The patient is a 65 y.o. year old female with HTN, newly diagnosed right breast atypical hyperplasia, oncologic history outlined below.  Referred to our clinic for discussion on chemoprevention.  Today she is here accompanied by her wife.  She has healed quite well from the surgery.  She is contemplating knee surgery.  Denies any new concerns or complaints.  No family history of breast cancer.  She is nulliparous.      Oncology history  10/24/2024 screening mammogram-calcification in right breast require additional evaluation.  BI-RADS 0  10/31/2024 right breast diagnostic mammogram-amorphous calcifications with grouped distribution in posterior one third upper outer region of right breast.  Calcifications in right breast suspicious.  Stereotactic biopsy recommended.  BI-RADS 4 8  11/20/2024 breast right upper quadrant stereotactic core biopsy for calcification-atypical ductal hyperplasia with associated microcalcifications.  Sclerosing adenosis.  1/3/2025 left breast 12:00 biopsy-benign breast tissue with clustered apocrine cyst, columnar cell change.  Moderate ductal hyperplasia of the usual type  1/10/2025 status post right breast partial mastectomy-radial scar.  Moderate ductal hyperplasia of the usual type. Right lateral margin excision-atypical ductal hyperplasia.  Microcalcifications in nonneoplastic tissue.  Margins free of atypia, in situ and invasive disease    Family history  Mother - skin cancer    Gyn history:  Age at first childbirth - n/a  Age at menarche - 14 years  Lactation/how long - n/a  Age at menopause - 53 (s/p ablation 2011 for menorrhagia)  Total years of oral contraceptive use - no  Total years of hormone replacement therapy  "-no        MEDICATIONS    Current Outpatient Medications:     amLODIPine (NORVASC) 10 MG tablet, Take 1 tablet by mouth Daily., Disp: , Rfl:     anastrozole (Arimidex) 1 MG tablet, Take 1 tablet by mouth Daily for 90 days., Disp: 30 tablet, Rfl: 2    losartan (COZAAR) 100 MG tablet, Take 1 tablet by mouth Daily., Disp: , Rfl:     rOPINIRole (REQUIP) 1 MG tablet, Take 1 tablet by mouth Every Evening., Disp: , Rfl:     ALLERGIES:     Allergies   Allergen Reactions    Lisinopril Cough    Peanut (Diagnostic) Hives, Itching, Rash and Swelling       I have reviewed Past Medical, Surgical History, Social History, Meds and Allergies.     REVIEW OF SYSTEMS:  See interval History           Vitals:    04/24/25 1435   BP: 145/80   Pulse: 70   Resp: 16   Temp: 98 °F (36.7 °C)   TempSrc: Oral   SpO2: 96%   Weight: 98.4 kg (216 lb 14.4 oz)   Height: 174 cm (68.5\")   PainSc: 0-No pain           4/24/2025     2:33 PM   Current Status   ECOG score 0        PHYSICAL EXAM:    Physical Exam  Constitutional:       Appearance: Normal appearance.   HENT:      Head: Normocephalic and atraumatic.      Mouth/Throat:      Mouth: Mucous membranes are moist.      Pharynx: Oropharynx is clear.   Eyes:      Extraocular Movements: Extraocular movements intact.      Pupils: Pupils are equal, round, and reactive to light.   Cardiovascular:      Rate and Rhythm: Normal rate and regular rhythm.   Pulmonary:      Effort: Pulmonary effort is normal.      Breath sounds: Normal breath sounds.   Chest:   Breasts:     Right: No mass, nipple discharge or skin change.      Left: No mass, nipple discharge or skin change.   Abdominal:      General: Bowel sounds are normal.      Palpations: Abdomen is soft.   Musculoskeletal:      Cervical back: Normal range of motion and neck supple.   Lymphadenopathy:      Upper Body:      Right upper body: No axillary adenopathy.      Left upper body: No axillary adenopathy.   Neurological:      General: No focal deficit " present.      Mental Status: She is alert and oriented to person, place, and time.   Psychiatric:         Mood and Affect: Mood normal.         Behavior: Behavior normal.      I have reexamined the patient and the results are consistent with the previously documented exam. Dolores Johnson MD        RECENT LABS:        WBC   Date Value Ref Range Status   04/24/2025 7.15 3.40 - 10.80 10*3/mm3 Final   02/27/2025 8.31 3.40 - 10.80 10*3/mm3 Final   01/03/2025 8.32 3.40 - 10.80 10*3/mm3 Final   12/28/2023 6.65 4.5 - 11.0 10*3/uL Final   12/29/2022 6.42 4.5 - 11.0 10*3/uL Final   12/09/2021 6.46 4.5 - 11.0 10*3/uL Final   06/17/2021 6.08 4.5 - 11.0 10*3/uL Final   12/17/2020 7.26 4.5 - 11.0 10*3/uL Final   03/12/2020 6.70 4.5 - 11.0 10*3/uL Final   11/22/2019 6.32 4.5 - 11.0 10*3/uL Final   12/06/2018 5.4 4.5 - 11.0 10*3/uL Final     Hemoglobin   Date Value Ref Range Status   04/24/2025 13.5 12.0 - 15.9 g/dL Final   02/27/2025 13.6 12.0 - 15.9 g/dL Final   01/03/2025 13.8 12.0 - 15.9 g/dL Final   12/28/2023 14.1 12.0 - 16.0 g/dL Final   12/29/2022 14.1 12.0 - 16.0 g/dL Final   12/09/2021 14.1 12.0 - 16.0 g/dL Final   06/17/2021 14.7 12.0 - 16.0 g/dL Final   12/17/2020 14.2 12.0 - 16.0 g/dL Final   03/12/2020 14.3 12.0 - 16.0 g/dL Final   11/22/2019 15.8 12.0 - 16.0 g/dL Final   12/06/2018 13.2 12.0 - 16.0 g/dL Final     Platelets   Date Value Ref Range Status   04/24/2025 277 140 - 450 10*3/mm3 Final   02/27/2025 290 140 - 450 10*3/mm3 Final   01/03/2025 301 140 - 450 10*3/mm3 Final   12/28/2023 287 140 - 440 10*3/uL Final   12/29/2022 285 140 - 440 10*3/uL Final   12/09/2021 283 140 - 440 10*3/uL Final   06/17/2021 288 140 - 440 10*3/uL Final   12/17/2020 268 140 - 440 10*3/uL Final   03/12/2020 258 140 - 440 10*3/uL Final   11/22/2019 221 140 - 440 10*3/uL Final   12/06/2018 248 150 - 450 10*3/uL Final       Pathology:  Tissue Pathology Exam (01/10/2025 14:06)   Tissue Pathology Exam (01/03/2025 13:36)   Tissue Pathology  Exam (11/20/2024 11:12)     Imaging:  MAMMO Scan (11/22/2024)   MAMMO Scan (11/22/2024)  MAMMO Scan (11/22/2024)    Assessment:  *Atypical ductal hyperplasia, right breast  *Cleo 5-year risk 0.9%  10/24/2024 screening mammogram-calcification in right breast require additional evaluation.  BI-RADS 0  10/31/2024 right breast diagnostic mammogram-amorphous calcifications with grouped distribution in posterior one third upper outer region of right breast.  Calcifications in right breast suspicious.  Stereotactic biopsy recommended.  BI-RADS 4 8  11/20/2024 breast right upper quadrant stereotactic core biopsy for calcification-atypical ductal hyperplasia with associated microcalcifications.  Sclerosing adenosis.  1/3/2025 left breast 12:00 biopsy-benign breast tissue with clustered apocrine cyst, columnar cell change.  Moderate ductal hyperplasia of the usual type  1/10/2025 status post right breast partial mastectomy-radial scar.  Moderate ductal hyperplasia of the usual type. Right lateral margin excision-atypical ductal hyperplasia.  Microcalcifications in nonneoplastic tissue.  Margins free of atypia, in situ and invasive disease  2/27/2025: Started Arimidex 1 mg daily for chemoprophylaxis  4/24/2025: Tolerating Arimidex well.    *Bone health  January 2025 DEXA scan-unremarkable    Plan  Continue Arimidex 1 mg daily for chemoprophylaxis.  Screening mammogram due in October 2025; MRI breast in January 2026.  Ordered by Dr. Eugene's office  MD visit in 6 months with labs for toxicity check

## 2025-04-24 ENCOUNTER — LAB (OUTPATIENT)
Dept: LAB | Facility: HOSPITAL | Age: 65
End: 2025-04-24
Payer: COMMERCIAL

## 2025-04-24 ENCOUNTER — OFFICE VISIT (OUTPATIENT)
Dept: ONCOLOGY | Facility: CLINIC | Age: 65
End: 2025-04-24
Payer: COMMERCIAL

## 2025-04-24 VITALS
WEIGHT: 216.9 LBS | HEIGHT: 69 IN | TEMPERATURE: 98 F | BODY MASS INDEX: 32.12 KG/M2 | SYSTOLIC BLOOD PRESSURE: 145 MMHG | HEART RATE: 70 BPM | RESPIRATION RATE: 16 BRPM | OXYGEN SATURATION: 96 % | DIASTOLIC BLOOD PRESSURE: 80 MMHG

## 2025-04-24 DIAGNOSIS — N60.91 ATYPICAL DUCTAL HYPERPLASIA OF RIGHT BREAST: Primary | ICD-10-CM

## 2025-04-24 DIAGNOSIS — Z79.899 HIGH RISK MEDICATION USE: ICD-10-CM

## 2025-04-24 DIAGNOSIS — N60.91 ATYPICAL DUCTAL HYPERPLASIA OF RIGHT BREAST: ICD-10-CM

## 2025-04-24 LAB
ALBUMIN SERPL-MCNC: 4.1 G/DL (ref 3.5–5.2)
ALBUMIN/GLOB SERPL: 1.6 G/DL
ALP SERPL-CCNC: 99 U/L (ref 39–117)
ALT SERPL W P-5'-P-CCNC: 19 U/L (ref 1–33)
ANION GAP SERPL CALCULATED.3IONS-SCNC: 9.8 MMOL/L (ref 5–15)
AST SERPL-CCNC: 18 U/L (ref 1–32)
BASOPHILS # BLD AUTO: 0.03 10*3/MM3 (ref 0–0.2)
BASOPHILS NFR BLD AUTO: 0.4 % (ref 0–1.5)
BILIRUB SERPL-MCNC: 0.2 MG/DL (ref 0–1.2)
BUN SERPL-MCNC: 9 MG/DL (ref 8–23)
BUN/CREAT SERPL: 10.5 (ref 7–25)
CALCIUM SPEC-SCNC: 9.5 MG/DL (ref 8.6–10.5)
CHLORIDE SERPL-SCNC: 107 MMOL/L (ref 98–107)
CO2 SERPL-SCNC: 23.2 MMOL/L (ref 22–29)
CREAT SERPL-MCNC: 0.86 MG/DL (ref 0.57–1)
DEPRECATED RDW RBC AUTO: 41 FL (ref 37–54)
EGFRCR SERPLBLD CKD-EPI 2021: 76 ML/MIN/1.73
EOSINOPHIL # BLD AUTO: 0.16 10*3/MM3 (ref 0–0.4)
EOSINOPHIL NFR BLD AUTO: 2.2 % (ref 0.3–6.2)
ERYTHROCYTE [DISTWIDTH] IN BLOOD BY AUTOMATED COUNT: 12.5 % (ref 12.3–15.4)
GLOBULIN UR ELPH-MCNC: 2.6 GM/DL
GLUCOSE SERPL-MCNC: 85 MG/DL (ref 65–99)
HCT VFR BLD AUTO: 41.2 % (ref 34–46.6)
HGB BLD-MCNC: 13.5 G/DL (ref 12–15.9)
IMM GRANULOCYTES # BLD AUTO: 0.01 10*3/MM3 (ref 0–0.05)
IMM GRANULOCYTES NFR BLD AUTO: 0.1 % (ref 0–0.5)
LYMPHOCYTES # BLD AUTO: 1.94 10*3/MM3 (ref 0.7–3.1)
LYMPHOCYTES NFR BLD AUTO: 27.1 % (ref 19.6–45.3)
MCH RBC QN AUTO: 29.3 PG (ref 26.6–33)
MCHC RBC AUTO-ENTMCNC: 32.8 G/DL (ref 31.5–35.7)
MCV RBC AUTO: 89.6 FL (ref 79–97)
MONOCYTES # BLD AUTO: 0.61 10*3/MM3 (ref 0.1–0.9)
MONOCYTES NFR BLD AUTO: 8.5 % (ref 5–12)
NEUTROPHILS NFR BLD AUTO: 4.4 10*3/MM3 (ref 1.7–7)
NEUTROPHILS NFR BLD AUTO: 61.7 % (ref 42.7–76)
NRBC BLD AUTO-RTO: 0 /100 WBC (ref 0–0.2)
PLATELET # BLD AUTO: 277 10*3/MM3 (ref 140–450)
PMV BLD AUTO: 10 FL (ref 6–12)
POTASSIUM SERPL-SCNC: 4 MMOL/L (ref 3.5–5.2)
PROT SERPL-MCNC: 6.7 G/DL (ref 6–8.5)
RBC # BLD AUTO: 4.6 10*6/MM3 (ref 3.77–5.28)
SODIUM SERPL-SCNC: 140 MMOL/L (ref 136–145)
WBC NRBC COR # BLD AUTO: 7.15 10*3/MM3 (ref 3.4–10.8)

## 2025-04-24 PROCEDURE — 36415 COLL VENOUS BLD VENIPUNCTURE: CPT

## 2025-04-24 PROCEDURE — 85025 COMPLETE CBC W/AUTO DIFF WBC: CPT

## 2025-04-24 PROCEDURE — 80053 COMPREHEN METABOLIC PANEL: CPT

## 2025-04-24 PROCEDURE — 99214 OFFICE O/P EST MOD 30 MIN: CPT | Performed by: STUDENT IN AN ORGANIZED HEALTH CARE EDUCATION/TRAINING PROGRAM

## 2025-05-27 DIAGNOSIS — N60.91 ATYPICAL DUCTAL HYPERPLASIA OF RIGHT BREAST: ICD-10-CM

## 2025-05-28 RX ORDER — ANASTROZOLE 1 MG/1
1 TABLET ORAL DAILY
Qty: 30 TABLET | Refills: 2 | Status: SHIPPED | OUTPATIENT
Start: 2025-05-28

## 2025-07-24 ENCOUNTER — OFFICE VISIT (OUTPATIENT)
Dept: SURGERY | Facility: CLINIC | Age: 65
End: 2025-07-24
Payer: COMMERCIAL

## 2025-07-24 ENCOUNTER — TELEPHONE (OUTPATIENT)
Dept: SURGERY | Facility: CLINIC | Age: 65
End: 2025-07-24
Payer: COMMERCIAL

## 2025-07-24 VITALS
BODY MASS INDEX: 31.13 KG/M2 | HEART RATE: 94 BPM | SYSTOLIC BLOOD PRESSURE: 140 MMHG | WEIGHT: 210.2 LBS | HEIGHT: 69 IN | OXYGEN SATURATION: 96 % | DIASTOLIC BLOOD PRESSURE: 86 MMHG

## 2025-07-24 DIAGNOSIS — F40.240 CLAUSTROPHOBIA: ICD-10-CM

## 2025-07-24 DIAGNOSIS — N60.91 ATYPICAL DUCTAL HYPERPLASIA OF RIGHT BREAST: Primary | ICD-10-CM

## 2025-07-24 DIAGNOSIS — Z91.89 AT HIGH RISK FOR BREAST CANCER: ICD-10-CM

## 2025-07-24 RX ORDER — HYDROCODONE BITARTRATE AND ACETAMINOPHEN 10; 325 MG/1; MG/1
TABLET ORAL
COMMUNITY
Start: 2025-07-14

## 2025-07-24 RX ORDER — PROMETHAZINE HYDROCHLORIDE 25 MG/1
TABLET ORAL
COMMUNITY
Start: 2025-06-24

## 2025-07-24 RX ORDER — MUPIROCIN 2 %
OINTMENT (GRAM) TOPICAL
COMMUNITY
Start: 2025-06-16

## 2025-07-24 RX ORDER — DIAZEPAM 10 MG/1
10 TABLET ORAL ONCE
Qty: 1 TABLET | Refills: 0 | Status: SHIPPED | OUTPATIENT
Start: 2025-07-24 | End: 2025-07-24

## 2025-07-24 RX ORDER — TRAMADOL HYDROCHLORIDE 50 MG/1
TABLET ORAL
COMMUNITY
Start: 2025-06-30

## 2025-07-24 NOTE — PROGRESS NOTES
General History:  Liliam Collins is a 65 y.o. female with the following history: ADH discovered on screening mammogram, completed diagnostic workup and biopsy.  Surgery for complete excision of the imaging abnormality did not show any additional atypical hyperplasia.         Interval History: She has establish care with Dr. Johnson and started on Arimidex.  She is tolerating well without any noticeable side effects.  She recently underwent a right knee replacement and is healing quite well.  Have some issues with a pulled muscle that has improved and she is continuing physical therapy.    Past Medical History:   Diagnosis Date    Atypical hyperplasia of breast     RIGHT    Hypertension     RLS (restless legs syndrome)      Patient Active Problem List   Diagnosis    Atypical ductal hyperplasia of right breast    Essential hypertension    Family history of melanoma    Restless leg syndrome     Current Outpatient Medications on File Prior to Visit   Medication Sig Dispense Refill    amLODIPine (NORVASC) 10 MG tablet Take 1 tablet by mouth Daily.      anastrozole (ARIMIDEX) 1 MG tablet TAKE 1 TABLET BY MOUTH DAILY 30 tablet 2    HYDROcodone-acetaminophen (NORCO)  MG per tablet       losartan (COZAAR) 100 MG tablet Take 1 tablet by mouth Daily.      mupirocin (BACTROBAN) 2 % ointment       promethazine (PHENERGAN) 25 MG tablet       rOPINIRole (REQUIP) 1 MG tablet Take 1 tablet by mouth Every Evening.      traMADol (ULTRAM) 50 MG tablet        No current facility-administered medications on file prior to visit.     Allergies   Allergen Reactions    Lisinopril Cough    Peanut (Diagnostic) Hives, Itching, Rash and Swelling       Past Surgical History:   Procedure Laterality Date    BREAST BIOPSY  both    BREAST LUMPECTOMY Right 01/10/2025    Procedure: Right breast partial mastectomy VERONICA guided;  Surgeon: Namrata Eugene MD;  Location: [facility] OR;  Service: General;  Laterality: Right;    COLONOSCOPY      CYST  REMOVAL Right     ARMPIT     Social History     Socioeconomic History    Marital status:    Tobacco Use    Smoking status: Never    Smokeless tobacco: Never   Vaping Use    Vaping status: Never Used   Substance and Sexual Activity    Alcohol use: Never    Drug use: Never    Sexual activity: Yes     Partners: Female     Birth control/protection: None, Partner of same sex     Family History   Problem Relation Age of Onset    Melanoma Mother     Hypertension Mother     Skin cancer Mother     Heart failure Father     Alcohol abuse Brother     COPD Brother     Heart failure Brother         Heart operates at 20%    Malig Hyperthermia Neg Hx         Review of Systems:  CONSTITUTIONAL: No weight loss, fever, chills, weakness or fatigue.  HEENT: Eyes: No visual loss, blurred vision, double vision or yellow sclerae. Ears, Nose, Throat: No hearing loss, sneezing, congestion, runny nose or sore throat.  SKIN: No rash or itching.  CARDIOVASCULAR: No chest pain, chest pressure or chest discomfort. No palpitations or edema.  RESPIRATORY: No shortness of breath, cough or sputum.  GASTROINTESTINAL: No anorexia, nausea, vomiting or diarrhea. No abdominal pain or blood.  GENITOURINARY: No burning on urination  NEUROLOGICAL: No headache, dizziness, syncope, paralysis, ataxia, numbness or tingling in the extremities. No change in bowel or bladder control.  MUSCULOSKELETAL: No muscle, back pain, joint pain or stiffness.  HEMATOLOGIC: No anemia, bleeding or bruising.  LYMPHATICS: No enlarged nodes.  PSYCHIATRIC: No history of depression or anxiety.  ENDOCRINOLOGIC: No reports of sweating, cold or heat intolerance. No polyuria or polydipsia.  ALLERGIES: No history of asthma, hives, eczema or rhinitis.    Physical Exam:  Vitals:    07/24/25 0915   BP: 140/86   Pulse: 94   SpO2: 96%         General: Alert, cooperative    HEENT: Atraumatic, normocephalic    Oral/Maxillofacial: Moist mucous membranes    Neck: Supple     Lungs: EWOB,  clear to auscultation bilaterally     Heart: RRR    Breast: Bilateral breasts examined sitting and supine.  RIGHT- no masses, skin changes, or nipple discharge  LEFT- no masses, skin changes, or nipple discharge    Lymphatics:  Bilateral supraclavicular, cervical, and axillary basins without lymphadneopathy    Abdomen: Soft, non-tender, non-distended    Extremities: normal strength and sensation.  Right knee with well-healing replacement incision, Steri-Strips in place.  Neuro: alert, normal speech, no focal findings or movement disorder noted     Skin: no lesions or abrasions      Recent Imaging:  NA    Assessment/Plan: Liliam Collins is a 65 y.o.female with h/o atypical ductal hyperplasia on biopsy, surgical excision completed no additional pathology identified.  Claustrophobia.    - RTC after MRI for clinical breast exam-January or February.  She will see our nurse practitioners for ongoing high risk follow-up  -MRI due in January.  Ordered Valium for her issues with claustrophobia during imaging.  - Next imaging due mammogram due in October, completed at her gynecologist office.  Continue breast exams with GYN provider annually  - On Arimidex for risk reduction.  Follows with Dr. Johnson.  Started February 2025.    Time-Based Care: The total time today, spent on care for this patient, was 20 minutes which included preparing to see the patient, obtaining and/or reviewing a separately obtained history, performing a medically necessary exam and evaluation, counseling the patient/family/caregiver, ordering medications, tests and/or procedures, documenting clinical information in the medical record, independently interpreting results, communicating results to the patient/family/caregiver, care coordination and referring and communicating with others. This time was independent and non-overlapping.    Namrata Eugene MD  Breast Surgical Oncology    Return in about 6 months (around 1/24/2026) for Breast MRI, Follow up with  NP.

## 2025-08-25 DIAGNOSIS — N60.91 ATYPICAL DUCTAL HYPERPLASIA OF RIGHT BREAST: ICD-10-CM

## 2025-08-25 RX ORDER — ANASTROZOLE 1 MG/1
1 TABLET ORAL DAILY
Qty: 30 TABLET | Refills: 2 | Status: SHIPPED | OUTPATIENT
Start: 2025-08-25

## (undated) DEVICE — ANTIBACTERIAL UNDYED BRAIDED (POLYGLACTIN 910), SYNTHETIC ABSORBABLE SUTURE: Brand: COATED VICRYL

## (undated) DEVICE — PATIENT RETURN ELECTRODE, SINGLE-USE, CONTACT QUALITY MONITORING, ADULT, WITH 9FT CORD, FOR PATIENTS WEIGING OVER 33LBS. (15KG): Brand: MEGADYNE

## (undated) DEVICE — PK UNIV COMPL 40

## (undated) DEVICE — APPL CHLORAPREP HI/LITE 26ML ORNG

## (undated) DEVICE — SHEATH GUIDE SCOUT SURG TPR 8.3TO3.2CM 264CM STRL

## (undated) DEVICE — STPLR SKIN VISISTAT WD 35CT

## (undated) DEVICE — SYR LL TP 10ML STRL

## (undated) DEVICE — COVER,MAYO STAND,STERILE: Brand: MEDLINE

## (undated) DEVICE — ADHS SKIN SURG TISS VISC PREMIERPRO EXOFIN HI/VISC 1ML

## (undated) DEVICE — CONTAINER,SPECIMEN,OR STERILE,4OZ: Brand: MEDLINE

## (undated) DEVICE — SUT SILK 2/0 SH 30IN K833H

## (undated) DEVICE — PAD,ABDOMINAL,8"X10",ST,LF: Brand: MEDLINE

## (undated) DEVICE — HYPODERMIC SAFETY NEEDLE: Brand: MONOJECT

## (undated) DEVICE — TRAP FLD MINIVAC MEGADYNE 100ML

## (undated) DEVICE — ELECTRD BLD EZ CLN MOD XLNG 2.75IN

## (undated) DEVICE — GLV SURG BIOGEL LTX PF 7

## (undated) DEVICE — GLV SURG SENSICARE PI LF PF 7.5 GRN STRL

## (undated) DEVICE — SUT MNCRYL PLS ANTIB UD 4/0 PS2 18IN

## (undated) DEVICE — LEGGINGS, PAIR, 31X48, STERILE: Brand: MEDLINE